# Patient Record
Sex: FEMALE | Race: BLACK OR AFRICAN AMERICAN | NOT HISPANIC OR LATINO | URBAN - METROPOLITAN AREA
[De-identification: names, ages, dates, MRNs, and addresses within clinical notes are randomized per-mention and may not be internally consistent; named-entity substitution may affect disease eponyms.]

---

## 2017-02-20 ENCOUNTER — EMERGENCY (EMERGENCY)
Facility: HOSPITAL | Age: 47
LOS: 1 days | Discharge: HOME | End: 2017-02-20
Attending: EMERGENCY MEDICINE | Admitting: EMERGENCY MEDICINE
Payer: COMMERCIAL

## 2017-02-20 VITALS
OXYGEN SATURATION: 100 % | RESPIRATION RATE: 16 BRPM | HEART RATE: 70 BPM | SYSTOLIC BLOOD PRESSURE: 142 MMHG | DIASTOLIC BLOOD PRESSURE: 75 MMHG | TEMPERATURE: 98 F

## 2017-02-20 DIAGNOSIS — Z90.49 ACQUIRED ABSENCE OF OTHER SPECIFIED PARTS OF DIGESTIVE TRACT: Chronic | ICD-10-CM

## 2017-02-20 DIAGNOSIS — Z98.84 BARIATRIC SURGERY STATUS: Chronic | ICD-10-CM

## 2017-02-20 PROCEDURE — 99284 EMERGENCY DEPT VISIT MOD MDM: CPT

## 2017-02-20 RX ORDER — SODIUM CHLORIDE 9 MG/ML
1000 INJECTION INTRAMUSCULAR; INTRAVENOUS; SUBCUTANEOUS ONCE
Qty: 0 | Refills: 0 | Status: COMPLETED | OUTPATIENT
Start: 2017-02-20 | End: 2017-02-20

## 2017-02-20 RX ORDER — METOCLOPRAMIDE HCL 10 MG
10 TABLET ORAL ONCE
Qty: 0 | Refills: 0 | Status: COMPLETED | OUTPATIENT
Start: 2017-02-20 | End: 2017-02-20

## 2017-02-20 RX ORDER — KETOROLAC TROMETHAMINE 30 MG/ML
30 SYRINGE (ML) INJECTION ONCE
Qty: 0 | Refills: 0 | Status: DISCONTINUED | OUTPATIENT
Start: 2017-02-20 | End: 2017-02-20

## 2017-02-20 RX ADMIN — SODIUM CHLORIDE 1000 MILLILITER(S): 9 INJECTION INTRAMUSCULAR; INTRAVENOUS; SUBCUTANEOUS at 23:22

## 2017-02-20 RX ADMIN — Medication 30 MILLIGRAM(S): at 23:21

## 2017-02-20 RX ADMIN — Medication 10 MILLIGRAM(S): at 23:22

## 2017-02-20 NOTE — ED PROVIDER NOTE - CARE PLAN
Principal Discharge DX:	Migraine Principal Discharge DX:	Migraine  Instructions for follow-up, activity and diet:	You were seen today for your migraine.  Rest in a dark quiet room and take ibuprofen as needed for continued pain.  Be sure to follow up with your primary care physician in 2-3 days for re-evaluation.  Call the medicine clinic if you need a new primary care physician at [phone].  RETURN TO THE EMERGENCY DEPARTMENT IMMEDIATELY IF YOU DEVELOP SEVERE PAIN THAT CANNOT BE CONTROLLED BY MEDICATION, FEVER, NECK STIFFNESS, OR FOR ANY OTHER CONCERN. Principal Discharge DX:	Migraine  Instructions for follow-up, activity and diet:	You were seen today for your migraine.  Rest in a dark quiet room and take ibuprofen as needed for continued pain.  Be sure to follow up with your primary care physician in 2-3 days for re-evaluation.  Call the medicine clinic if you need a new primary care physician at [phone].  RETURN TO THE EMERGENCY DEPARTMENT IMMEDIATELY IF YOU DEVELOP SEVERE PAIN THAT CANNOT BE CONTROLLED BY MEDICATION, FEVER, NECK STIFFNESS, OR FOR ANY OTHER CONCERN.

## 2017-02-20 NOTE — ED PROVIDER NOTE - NS ED MD SCRIBE ATTENDING SCRIBE SECTIONS
PAST MEDICAL/SURGICAL/SOCIAL HISTORY/HIV/VITAL SIGNS( Pullset)/HISTORY OF PRESENT ILLNESS/REVIEW OF SYSTEMS/PHYSICAL EXAM/DISPOSITION

## 2017-02-20 NOTE — ED PROVIDER NOTE - PLAN OF CARE
You were seen today for your migraine.  Rest in a dark quiet room and take ibuprofen as needed for continued pain.  Be sure to follow up with your primary care physician in 2-3 days for re-evaluation.  Call the medicine clinic if you need a new primary care physician at [phone].  RETURN TO THE EMERGENCY DEPARTMENT IMMEDIATELY IF YOU DEVELOP SEVERE PAIN THAT CANNOT BE CONTROLLED BY MEDICATION, FEVER, NECK STIFFNESS, OR FOR ANY OTHER CONCERN.

## 2017-02-20 NOTE — ED ADULT NURSE NOTE - OBJECTIVE STATEMENT
pt states she developed headache this morning with nausea about 12 n. c.o. fever of 101, blurry vision and "alittle" photosensitivity. c.o. generalized body aches. denies vomiting and diarrhea. pt c.o. pain in back of head 8/10 going into neck. sl placed labs sent. no neuceal rigidity noted. fluids infusing, medicated as ordered.

## 2017-02-20 NOTE — ED ADULT TRIAGE NOTE - CHIEF COMPLAINT QUOTE
c.o headache and blurry vision starting yesterday with sharp pains to generalized body. also c.o tinging to fingers. no weakness noted. pmh migraines anxiety anemia

## 2017-02-20 NOTE — ED PROVIDER NOTE - MEDICAL DECISION MAKING DETAILS
46y F with history of migraines with headache, blurred vision, subjective fever, and muscle aches. Suspect viral syndrome exacerbating known history of migraines. Will give IV fluid, Toradol, Reglan, and reassess. Pt requests med clinic f/u at [facility] as she is not happy with current provider, will give phone number. 46y F with history of migraines with headache, blurred vision, subjective fever, and muscle aches. Suspect viral syndrome exacerbating migraine ha. Will give IV fluid, Toradol, Reglan, and reassess. Pt requests med clinic f/u at [facility] as she is not happy with current provider, will give phone number.

## 2017-02-20 NOTE — ED PROVIDER NOTE - OBJECTIVE STATEMENT
46y F with PMHx of migraine presents to the ED c/o headache, muscle aches, and blurred vision x 2 days. Per pt she has been taking her home migraine medications without relief. Has had subjective fever at home. Denies recent travel, sick contacts, nausea, vomiting, diarrhea, dysuria, vaginal bleeding, vaginal discharge, or any other complaints.

## 2017-02-20 NOTE — ED PROVIDER NOTE - PROGRESS NOTE DETAILS
MD CHO:  Patient seen and reassessed.  Patient symptomatically improved.  Patient able to ambulate w/o difficulty, is tolerating PO intake.  Patient verbalized understanding of hospital course and outpatient plans, has decision making capacity.  Will follow-up with primary care physician in the next 2 days; patient will call for an appointment. Will return to the ED if there is any worsening of symptoms.

## 2017-02-20 NOTE — ED PROVIDER NOTE - DETAILS:
The scribe's documentation has been prepared under my direction and personally reviewed by me in its entirety. I confirm that the note above accurately reflects all work, treatment, procedures, and medical decision making performed by me (Dr. Adams).

## 2017-11-15 ENCOUNTER — APPOINTMENT (OUTPATIENT)
Dept: ENDOCRINOLOGY | Facility: CLINIC | Age: 47
End: 2017-11-15
Payer: COMMERCIAL

## 2017-11-15 VITALS
OXYGEN SATURATION: 97 % | BODY MASS INDEX: 39.78 KG/M2 | WEIGHT: 233 LBS | DIASTOLIC BLOOD PRESSURE: 76 MMHG | SYSTOLIC BLOOD PRESSURE: 122 MMHG | HEART RATE: 71 BPM | HEIGHT: 64 IN

## 2017-11-15 DIAGNOSIS — E04.1 NONTOXIC SINGLE THYROID NODULE: ICD-10-CM

## 2017-11-15 DIAGNOSIS — E55.9 VITAMIN D DEFICIENCY, UNSPECIFIED: ICD-10-CM

## 2017-11-15 DIAGNOSIS — R73.09 OTHER ABNORMAL GLUCOSE: ICD-10-CM

## 2017-11-15 DIAGNOSIS — R11.0 NAUSEA: ICD-10-CM

## 2017-11-15 DIAGNOSIS — R73.03 PREDIABETES.: ICD-10-CM

## 2017-11-15 PROCEDURE — 99244 OFF/OP CNSLTJ NEW/EST MOD 40: CPT

## 2017-11-15 RX ORDER — MECLIZINE HYDROCHLORIDE 25 MG/1
25 TABLET ORAL
Refills: 0 | Status: ACTIVE | COMMUNITY
Start: 2017-11-15

## 2017-11-15 RX ORDER — RANITIDINE HYDROCHLORIDE 300 MG/1
300 TABLET, FILM COATED ORAL
Refills: 0 | Status: ACTIVE | COMMUNITY
Start: 2017-11-15

## 2017-11-15 RX ORDER — METHOCARBAMOL 750 MG/1
750 TABLET, FILM COATED ORAL
Refills: 0 | Status: ACTIVE | COMMUNITY
Start: 2017-11-15

## 2017-11-15 RX ORDER — CHOLECALCIFEROL (VITAMIN D3) 1250 MCG
1.25 MG CAPSULE ORAL
Qty: 12 | Refills: 0 | Status: ACTIVE | COMMUNITY
Start: 2017-11-15

## 2017-11-17 LAB
25(OH)D3 SERPL-MCNC: 26.2 NG/ML
ALBUMIN SERPL ELPH-MCNC: 4 G/DL
ALP BLD-CCNC: 82 U/L
ALT SERPL-CCNC: 7 U/L
ANION GAP SERPL CALC-SCNC: 13 MMOL/L
AST SERPL-CCNC: 11 U/L
BILIRUB SERPL-MCNC: 0.2 MG/DL
BUN SERPL-MCNC: 12 MG/DL
CALCIUM SERPL-MCNC: 9.1 MG/DL
CHLORIDE SERPL-SCNC: 102 MMOL/L
CHOLEST SERPL-MCNC: 137 MG/DL
CHOLEST/HDLC SERPL: 2.7 RATIO
CO2 SERPL-SCNC: 24 MMOL/L
CREAT SERPL-MCNC: 0.86 MG/DL
GLUCOSE SERPL-MCNC: 77 MG/DL
HBA1C MFR BLD HPLC: 5.9 %
HDLC SERPL-MCNC: 50 MG/DL
IRON SERPL-MCNC: 68 UG/DL
LDLC SERPL CALC-MCNC: 75 MG/DL
POTASSIUM SERPL-SCNC: 4.5 MMOL/L
PROT SERPL-MCNC: 7.6 G/DL
SODIUM SERPL-SCNC: 139 MMOL/L
TRIGL SERPL-MCNC: 58 MG/DL
TSH SERPL-ACNC: 3.28 UIU/ML

## 2017-11-20 ENCOUNTER — CLINICAL ADVICE (OUTPATIENT)
Age: 47
End: 2017-11-20

## 2018-01-17 ENCOUNTER — EMERGENCY (EMERGENCY)
Facility: HOSPITAL | Age: 48
LOS: 1 days | Discharge: HOME | End: 2018-01-17
Attending: EMERGENCY MEDICINE | Admitting: EMERGENCY MEDICINE
Payer: COMMERCIAL

## 2018-01-17 VITALS
SYSTOLIC BLOOD PRESSURE: 131 MMHG | RESPIRATION RATE: 16 BRPM | DIASTOLIC BLOOD PRESSURE: 81 MMHG | HEART RATE: 69 BPM | OXYGEN SATURATION: 98 % | TEMPERATURE: 98 F

## 2018-01-17 DIAGNOSIS — Z98.84 BARIATRIC SURGERY STATUS: Chronic | ICD-10-CM

## 2018-01-17 DIAGNOSIS — Z90.49 ACQUIRED ABSENCE OF OTHER SPECIFIED PARTS OF DIGESTIVE TRACT: Chronic | ICD-10-CM

## 2018-01-17 PROCEDURE — 99284 EMERGENCY DEPT VISIT MOD MDM: CPT

## 2018-01-17 PROCEDURE — 71046 X-RAY EXAM CHEST 2 VIEWS: CPT | Mod: 26

## 2018-01-17 RX ORDER — ACETAMINOPHEN 500 MG
650 TABLET ORAL ONCE
Qty: 0 | Refills: 0 | Status: COMPLETED | OUTPATIENT
Start: 2018-01-17 | End: 2018-01-17

## 2018-01-17 RX ORDER — KETOROLAC TROMETHAMINE 30 MG/ML
15 SYRINGE (ML) INJECTION ONCE
Qty: 0 | Refills: 0 | Status: DISCONTINUED | OUTPATIENT
Start: 2018-01-17 | End: 2018-01-17

## 2018-01-17 RX ORDER — METOCLOPRAMIDE HCL 10 MG
10 TABLET ORAL ONCE
Qty: 0 | Refills: 0 | Status: COMPLETED | OUTPATIENT
Start: 2018-01-17 | End: 2018-01-17

## 2018-01-17 RX ADMIN — Medication 10 MILLIGRAM(S): at 12:59

## 2018-01-17 RX ADMIN — Medication 650 MILLIGRAM(S): at 12:59

## 2018-01-17 NOTE — ED PROVIDER NOTE - OBJECTIVE STATEMENT
46y/o F with PMHx of migraines (on Fioricet to no relief) presents to the ED c/o HA x2-3d, subjective fever beginning last night, cough 3-4w. Denies vomiting, rash, recent travel, abx use, any other complaints. Allergic to Morphine.

## 2018-01-17 NOTE — ED ADULT NURSE REASSESSMENT NOTE - NS ED NURSE REASSESS COMMENT FT1
Pt feels better.  Pt treated and released by MD Rosenberg with instruction, pt verbalized understanding.   IV removed.  Left stable.

## 2018-01-17 NOTE — ED ADULT TRIAGE NOTE - CHIEF COMPLAINT QUOTE
Pt c/o 10 days of headache, pain to left arm, and had swelling to left side of face that has resolved. Pt reports being seen at another hospital for same symptoms and "got a shot in my arm". Pt denies chest pain, denies N/V, denies dizziness. Pt states she's unable to take Motrin due to surgery she had in the past (not an allergy)

## 2018-02-23 ENCOUNTER — EMERGENCY (EMERGENCY)
Facility: HOSPITAL | Age: 48
LOS: 1 days | Discharge: HOME | End: 2018-02-23
Attending: EMERGENCY MEDICINE | Admitting: EMERGENCY MEDICINE
Payer: COMMERCIAL

## 2018-02-23 VITALS
RESPIRATION RATE: 14 BRPM | OXYGEN SATURATION: 100 % | HEART RATE: 66 BPM | TEMPERATURE: 99 F | DIASTOLIC BLOOD PRESSURE: 76 MMHG | SYSTOLIC BLOOD PRESSURE: 134 MMHG

## 2018-02-23 VITALS
OXYGEN SATURATION: 100 % | TEMPERATURE: 98 F | RESPIRATION RATE: 18 BRPM | DIASTOLIC BLOOD PRESSURE: 86 MMHG | HEART RATE: 65 BPM | SYSTOLIC BLOOD PRESSURE: 129 MMHG

## 2018-02-23 DIAGNOSIS — Z98.84 BARIATRIC SURGERY STATUS: Chronic | ICD-10-CM

## 2018-02-23 DIAGNOSIS — Z90.49 ACQUIRED ABSENCE OF OTHER SPECIFIED PARTS OF DIGESTIVE TRACT: Chronic | ICD-10-CM

## 2018-02-23 LAB
ALBUMIN SERPL ELPH-MCNC: 3.9 G/DL — SIGNIFICANT CHANGE UP (ref 3.3–5)
ALP SERPL-CCNC: 98 U/L — SIGNIFICANT CHANGE UP (ref 40–120)
ALT FLD-CCNC: 10 U/L — SIGNIFICANT CHANGE UP (ref 4–33)
APPEARANCE UR: CLEAR — SIGNIFICANT CHANGE UP
AST SERPL-CCNC: 15 U/L — SIGNIFICANT CHANGE UP (ref 4–32)
BACTERIA # UR AUTO: SIGNIFICANT CHANGE UP
BASOPHILS # BLD AUTO: 0.06 K/UL — SIGNIFICANT CHANGE UP (ref 0–0.2)
BASOPHILS NFR BLD AUTO: 0.6 % — SIGNIFICANT CHANGE UP (ref 0–2)
BILIRUB SERPL-MCNC: < 0.2 MG/DL — LOW (ref 0.2–1.2)
BILIRUB UR-MCNC: NEGATIVE — SIGNIFICANT CHANGE UP
BLOOD UR QL VISUAL: NEGATIVE — SIGNIFICANT CHANGE UP
BUN SERPL-MCNC: 8 MG/DL — SIGNIFICANT CHANGE UP (ref 7–23)
CALCIUM SERPL-MCNC: 8.7 MG/DL — SIGNIFICANT CHANGE UP (ref 8.4–10.5)
CHLORIDE SERPL-SCNC: 102 MMOL/L — SIGNIFICANT CHANGE UP (ref 98–107)
CK MB BLD-MCNC: 0.7 — SIGNIFICANT CHANGE UP (ref 0–2.5)
CK MB BLD-MCNC: 1.18 NG/ML — SIGNIFICANT CHANGE UP (ref 1–4.7)
CK SERPL-CCNC: 167 U/L — SIGNIFICANT CHANGE UP (ref 25–170)
CO2 SERPL-SCNC: 28 MMOL/L — SIGNIFICANT CHANGE UP (ref 22–31)
COLOR SPEC: SIGNIFICANT CHANGE UP
CREAT SERPL-MCNC: 0.8 MG/DL — SIGNIFICANT CHANGE UP (ref 0.5–1.3)
EOSINOPHIL # BLD AUTO: 0.32 K/UL — SIGNIFICANT CHANGE UP (ref 0–0.5)
EOSINOPHIL NFR BLD AUTO: 3.3 % — SIGNIFICANT CHANGE UP (ref 0–6)
GLUCOSE SERPL-MCNC: 87 MG/DL — SIGNIFICANT CHANGE UP (ref 70–99)
GLUCOSE UR-MCNC: NEGATIVE — SIGNIFICANT CHANGE UP
HCT VFR BLD CALC: 32.7 % — LOW (ref 34.5–45)
HGB BLD-MCNC: 10 G/DL — LOW (ref 11.5–15.5)
IMM GRANULOCYTES # BLD AUTO: 0.03 # — SIGNIFICANT CHANGE UP
IMM GRANULOCYTES NFR BLD AUTO: 0.3 % — SIGNIFICANT CHANGE UP (ref 0–1.5)
KETONES UR-MCNC: NEGATIVE — SIGNIFICANT CHANGE UP
LEUKOCYTE ESTERASE UR-ACNC: NEGATIVE — SIGNIFICANT CHANGE UP
LYMPHOCYTES # BLD AUTO: 4.28 K/UL — HIGH (ref 1–3.3)
LYMPHOCYTES # BLD AUTO: 44.1 % — HIGH (ref 13–44)
MCHC RBC-ENTMCNC: 23.5 PG — LOW (ref 27–34)
MCHC RBC-ENTMCNC: 30.6 % — LOW (ref 32–36)
MCV RBC AUTO: 76.8 FL — LOW (ref 80–100)
MONOCYTES # BLD AUTO: 0.62 K/UL — SIGNIFICANT CHANGE UP (ref 0–0.9)
MONOCYTES NFR BLD AUTO: 6.4 % — SIGNIFICANT CHANGE UP (ref 2–14)
MUCOUS THREADS # UR AUTO: SIGNIFICANT CHANGE UP
NEUTROPHILS # BLD AUTO: 4.39 K/UL — SIGNIFICANT CHANGE UP (ref 1.8–7.4)
NEUTROPHILS NFR BLD AUTO: 45.3 % — SIGNIFICANT CHANGE UP (ref 43–77)
NITRITE UR-MCNC: NEGATIVE — SIGNIFICANT CHANGE UP
NRBC # FLD: 0 — SIGNIFICANT CHANGE UP
PH UR: 7 — SIGNIFICANT CHANGE UP (ref 4.6–8)
PLATELET # BLD AUTO: 463 K/UL — HIGH (ref 150–400)
PMV BLD: 9.8 FL — SIGNIFICANT CHANGE UP (ref 7–13)
POTASSIUM SERPL-MCNC: 3.9 MMOL/L — SIGNIFICANT CHANGE UP (ref 3.5–5.3)
POTASSIUM SERPL-SCNC: 3.9 MMOL/L — SIGNIFICANT CHANGE UP (ref 3.5–5.3)
PROT SERPL-MCNC: 7.7 G/DL — SIGNIFICANT CHANGE UP (ref 6–8.3)
PROT UR-MCNC: NEGATIVE MG/DL — SIGNIFICANT CHANGE UP
RBC # BLD: 4.26 M/UL — SIGNIFICANT CHANGE UP (ref 3.8–5.2)
RBC # FLD: 16.9 % — HIGH (ref 10.3–14.5)
RBC CASTS # UR COMP ASSIST: SIGNIFICANT CHANGE UP (ref 0–?)
SODIUM SERPL-SCNC: 139 MMOL/L — SIGNIFICANT CHANGE UP (ref 135–145)
SP GR SPEC: 1.02 — SIGNIFICANT CHANGE UP (ref 1–1.04)
SQUAMOUS # UR AUTO: SIGNIFICANT CHANGE UP
TROPONIN T SERPL-MCNC: < 0.06 NG/ML — SIGNIFICANT CHANGE UP (ref 0–0.06)
UROBILINOGEN FLD QL: NORMAL MG/DL — SIGNIFICANT CHANGE UP
WBC # BLD: 9.7 K/UL — SIGNIFICANT CHANGE UP (ref 3.8–10.5)
WBC # FLD AUTO: 9.7 K/UL — SIGNIFICANT CHANGE UP (ref 3.8–10.5)
WBC UR QL: SIGNIFICANT CHANGE UP (ref 0–?)

## 2018-02-23 PROCEDURE — 99285 EMERGENCY DEPT VISIT HI MDM: CPT | Mod: 25

## 2018-02-23 PROCEDURE — 71046 X-RAY EXAM CHEST 2 VIEWS: CPT | Mod: 26

## 2018-02-23 PROCEDURE — 93010 ELECTROCARDIOGRAM REPORT: CPT

## 2018-02-23 RX ORDER — DIAZEPAM 5 MG
1 TABLET ORAL
Qty: 3 | Refills: 0
Start: 2018-02-23 | End: 2018-02-23

## 2018-02-23 RX ORDER — METOCLOPRAMIDE HCL 10 MG
10 TABLET ORAL ONCE
Qty: 0 | Refills: 0 | Status: COMPLETED | OUTPATIENT
Start: 2018-02-23 | End: 2018-02-23

## 2018-02-23 RX ADMIN — Medication 10 MILLIGRAM(S): at 20:46

## 2018-02-23 NOTE — ED PROVIDER NOTE - PLAN OF CARE
Follow up with your primary care provider within 48 hours  Follow up with your cardiologist for an outpatient stress test  Rest and drink fluids  Take Tylenol 650mg every 6 hours as needed for headache  Return to the emergency department with any worsening or concerning symptoms, increased headache, chest pain, shortness of breath, weakness or any other concerns. Follow up with your primary care provider within 48 hours  Follow up with your cardiologist for an outpatient stress test  Follow up with a neurologist within 1 week, referral list provided  Take Valium 5mg, 1 tablet, every 8 hours as needed for muscle spasm, caution this medication causes drowsiness do not drive or drink alcohol while taking  Rest and drink fluids  Take Tylenol 650mg every 6 hours as needed for headache  Return to the emergency department with any worsening or concerning symptoms, increased headache, chest pain, shortness of breath, weakness or any other concerns.

## 2018-02-23 NOTE — ED PROVIDER NOTE - OBJECTIVE STATEMENT
47yF w/pmhx migraines and anxiety presenting with left sided chest pain and headache x 3 days. 47yF w/pmhx migraines and anxiety presenting with left sided chest pain and headache x 3 days. Pt states she has been experiencing intermittent headaches for the past few months, hx of migraines, describes headache as sharp pains in the posterior of her head, no photophobia or phonophobia. Pt states the chest pain had been intermittent but this afternoon has become constant, left sided with radiation to left arm, described as a soreness, feels better "after massaging the area", feels worse with lying flat. In addition pt has right sided neck tightness and mid back pain. Pt states this afternoon she developed sharp pains to her right lower back. Denies dysuria, hematuria, shortness of breath 47yF w/pmhx migraines, anxiety and anemia presenting with left sided chest pain and headache x 3 days. Pt states she has been experiencing intermittent headaches for the past few months, hx of migraines, describes headache as sharp pains in the posterior of her head, no photophobia or phonophobia. Pt states the chest pain had been intermittent but this afternoon has become constant, left sided with radiation to left arm, described as a soreness, feels better "after massaging the area", feels worse with lying flat. In addition pt has right sided neck tightness and mid back pain. Pt states this afternoon she developed sharp pains to her right lower back. +leg cramping intermittently the past few weeks. Denies dysuria, hematuria, shortness of breath, fever/chills, 47yF w/pmhx migraines, anxiety and anemia presenting with left sided chest pain and headache x 3 days. Pt states she has been experiencing intermittent headaches for the past few months, hx of migraines, describes headache as sharp pains in the posterior of her head, no photophobia or phonophobia. Pt states the chest pain had been intermittent but this afternoon has become constant, left sided with radiation to left arm, described as a soreness, feels better "after massaging the area", feels worse with lying flat. In addition pt has right sided neck tightness and mid back pain. Pt states this afternoon she developed sharp pains to her right lower back. +leg cramping intermittently the past few weeks. Denies dysuria, hematuria, shortness of breath, fever/chills, sick contacts, palpitations, leg swelling, OCP use, recent travel, recent immobilization or any other concerns.  Pt states her chest pain worsened while at work when she started to feel anxious

## 2018-02-23 NOTE — ED ADULT NURSE REASSESSMENT NOTE - NS ED NURSE REASSESS COMMENT FT1
Intake pt coming with sternal pain x few days rad. to left shoulder and axillary area denies dizziness/no SOB lab sent pt AOX 3 morbidly obese, lab sent, pending dispo.    Olivia Coats RN

## 2018-02-23 NOTE — ED PROVIDER NOTE - CARE PLAN
Assessment and plan of treatment:	Follow up with your primary care provider within 48 hours  Follow up with your cardiologist for an outpatient stress test  Rest and drink fluids  Take Tylenol 650mg every 6 hours as needed for headache  Return to the emergency department with any worsening or concerning symptoms, increased headache, chest pain, shortness of breath, weakness or any other concerns. Principal Discharge DX:	Chest pain  Assessment and plan of treatment:	Follow up with your primary care provider within 48 hours  Follow up with your cardiologist for an outpatient stress test  Follow up with a neurologist within 1 week, referral list provided  Take Valium 5mg, 1 tablet, every 8 hours as needed for muscle spasm, caution this medication causes drowsiness do not drive or drink alcohol while taking  Rest and drink fluids  Take Tylenol 650mg every 6 hours as needed for headache  Return to the emergency department with any worsening or concerning symptoms, increased headache, chest pain, shortness of breath, weakness or any other concerns.

## 2018-02-23 NOTE — ED PROVIDER NOTE - PROGRESS NOTE DETAILS
RUBEN Parsons: Discussed results with patient who is feeling well and agrees to PMD follow up and d/c home RUBEN Parsons: Discussed results with patient who is feeling well and agrees to PMD follow up and d/c home. Pt will f/u with her cardiologist for an outpatient stress test

## 2018-02-23 NOTE — ED PROVIDER NOTE - MEDICAL DECISION MAKING DETAILS
47yF w/ pmhx anemia, migraines, anxiety p/w chest pain x 3 days, mild headache. 47yF w/ pmhx anemia, migraines, anxiety p/w chest pain x 3 days, mild headache. Likely symptoms due to anxiety. Will check one set of cardiac enzymes, basic labs, ua and chest xray.

## 2018-02-23 NOTE — ED PROVIDER NOTE - ATTENDING CONTRIBUTION TO CARE
MD Bailey:  I performed a face to face bedside interview with patient regarding history of present illness, review of symptoms and past medical history. I completed an independent physical exam(documented below).  I have discussed patient's plan of care with ACP.   I agree with note as stated above, having amended the EMR as needed to reflect my findings. I have discussed the assessment and plan of care.  This includes during the time I functioned as the attending physician for this patient.  PE:  Gen: Alert, NAD  Head: NC, AT,  EOMI, normal lids/conjunctiva  ENT:  normal hearing, patent oropharynx without erythema/exudate, uvula midline  Neck: +supple, no tenderness/meningismus/JVD, +Trachea midline  Chest: b/l chest wall ttp, equal chest rise  Pulm: Bilateral BS, normal resp effort, no wheeze/stridor/retractions  CV: RRR, no M/R/G, +dist pulses  Abd: soft, NT/ND, +BS, no hepatosplenomegaly  Rectal: deferred  Mskel: no edema/erythema/cyanosis  Skin: no rash  Neuro: AAOx3, no sensory/motor deficits, CN 2-12 intact   MDM:  46yo F w/ pmh inclusive of migraines and anxiety, and sleeve gastrectomy, presenting for [facility]MC including headache, b/l neck pain X 3 months(for which she was evaluated here and at [facility], including negative CTA head/neck), back pain, intermittent whole body cramping, diffuse intermittent paresthesias, cp X 3days, constant since this morning, ?rads to left shoulder, not exertional, reproducible w/ certain movements and when pt presses on chest, non-smoker, denies etoh or drug use, some family hx of early heart dx, has been seen by family cardiologist recently, had normal 72hr holter monitoring, no stress test yet. H&P most consistent w/ msk pain, will r/o electrolyte imbalance, one set of Bharti, muscle relaxer, outpt cards f/u for stress test. Pt understands and agrees w/ plan.

## 2018-02-23 NOTE — ED ADULT TRIAGE NOTE - CHIEF COMPLAINT QUOTE
Pt c/o of chest pain states pain radiates to the left axillary and breast area and states she has right flank and lower back pain and states she has burning in her head.

## 2018-02-25 LAB — SPECIMEN SOURCE: SIGNIFICANT CHANGE UP

## 2018-02-26 LAB
-  AMPICILLIN: SIGNIFICANT CHANGE UP
-  CIPROFLOXACIN: SIGNIFICANT CHANGE UP
-  NITROFURANTOIN: SIGNIFICANT CHANGE UP
-  TETRACYCLINE: SIGNIFICANT CHANGE UP
-  VANCOMYCIN: SIGNIFICANT CHANGE UP
BACTERIA UR CULT: SIGNIFICANT CHANGE UP
METHOD TYPE: SIGNIFICANT CHANGE UP
ORGANISM # SPEC MICROSCOPIC CNT: SIGNIFICANT CHANGE UP

## 2018-02-27 RX ORDER — MOXIFLOXACIN HYDROCHLORIDE TABLETS, 400 MG 400 MG/1
1 TABLET, FILM COATED ORAL
Qty: 14 | Refills: 0
Start: 2018-02-27 | End: 2018-03-05

## 2018-02-27 NOTE — ED POST DISCHARGE NOTE - RESULT SUMMARY
UCX : Enterococcus faecalis 50,000-99,000CFU/ml and gram neg rods less than 10,000. Patient contact # [phone] patient denies fever, chills or dysuria but admits to some back pain that radiates to breast  bone.  Discussed with patient needs to follow up with MD and if can't get appt or has worsening symptoms or worrisome symptoms to return to ED. Discussed with patient can ERX Cipro 500mg BID X 7 days and then patient needs to follow up with MD for repeat UA/UCX. Patient denies any pregnancy presently. Discussed with patient need to return to ED if symptoms don't continue to improve or recur or develops any new or worsening symptoms that are of concern.

## 2018-05-11 ENCOUNTER — APPOINTMENT (OUTPATIENT)
Dept: ENDOCRINOLOGY | Facility: CLINIC | Age: 48
End: 2018-05-11

## 2019-02-12 ENCOUNTER — EMERGENCY (EMERGENCY)
Facility: HOSPITAL | Age: 49
LOS: 1 days | Discharge: HOME | End: 2019-02-12
Attending: EMERGENCY MEDICINE | Admitting: EMERGENCY MEDICINE
Payer: COMMERCIAL

## 2019-02-12 VITALS
DIASTOLIC BLOOD PRESSURE: 90 MMHG | OXYGEN SATURATION: 100 % | HEART RATE: 67 BPM | TEMPERATURE: 98 F | RESPIRATION RATE: 16 BRPM | SYSTOLIC BLOOD PRESSURE: 148 MMHG

## 2019-02-12 DIAGNOSIS — Z90.49 ACQUIRED ABSENCE OF OTHER SPECIFIED PARTS OF DIGESTIVE TRACT: Chronic | ICD-10-CM

## 2019-02-12 DIAGNOSIS — Z98.84 BARIATRIC SURGERY STATUS: Chronic | ICD-10-CM

## 2019-02-12 LAB
ANION GAP SERPL CALC-SCNC: 11 MMO/L — SIGNIFICANT CHANGE UP (ref 7–14)
BUN SERPL-MCNC: 11 MG/DL — SIGNIFICANT CHANGE UP (ref 7–23)
CALCIUM SERPL-MCNC: 8.8 MG/DL — SIGNIFICANT CHANGE UP (ref 8.4–10.5)
CHLORIDE SERPL-SCNC: 103 MMOL/L — SIGNIFICANT CHANGE UP (ref 98–107)
CO2 SERPL-SCNC: 26 MMOL/L — SIGNIFICANT CHANGE UP (ref 22–31)
CREAT SERPL-MCNC: 0.84 MG/DL — SIGNIFICANT CHANGE UP (ref 0.5–1.3)
GLUCOSE SERPL-MCNC: 90 MG/DL — SIGNIFICANT CHANGE UP (ref 70–99)
HCT VFR BLD CALC: 30.2 % — LOW (ref 34.5–45)
HGB BLD-MCNC: 9.2 G/DL — LOW (ref 11.5–15.5)
MCHC RBC-ENTMCNC: 23.4 PG — LOW (ref 27–34)
MCHC RBC-ENTMCNC: 30.5 % — LOW (ref 32–36)
MCV RBC AUTO: 76.6 FL — LOW (ref 80–100)
NRBC # FLD: 0 K/UL — LOW (ref 25–125)
PLATELET # BLD AUTO: 470 K/UL — HIGH (ref 150–400)
PMV BLD: 9.6 FL — SIGNIFICANT CHANGE UP (ref 7–13)
POTASSIUM SERPL-MCNC: 4.1 MMOL/L — SIGNIFICANT CHANGE UP (ref 3.5–5.3)
POTASSIUM SERPL-SCNC: 4.1 MMOL/L — SIGNIFICANT CHANGE UP (ref 3.5–5.3)
RBC # BLD: 3.94 M/UL — SIGNIFICANT CHANGE UP (ref 3.8–5.2)
RBC # FLD: 16.1 % — HIGH (ref 10.3–14.5)
SODIUM SERPL-SCNC: 140 MMOL/L — SIGNIFICANT CHANGE UP (ref 135–145)
WBC # BLD: 7.56 K/UL — SIGNIFICANT CHANGE UP (ref 3.8–10.5)
WBC # FLD AUTO: 7.56 K/UL — SIGNIFICANT CHANGE UP (ref 3.8–10.5)

## 2019-02-12 PROCEDURE — 71046 X-RAY EXAM CHEST 2 VIEWS: CPT | Mod: 26

## 2019-02-12 PROCEDURE — 70360 X-RAY EXAM OF NECK: CPT | Mod: 26

## 2019-02-12 PROCEDURE — 99284 EMERGENCY DEPT VISIT MOD MDM: CPT

## 2019-02-12 RX ORDER — METOCLOPRAMIDE HCL 10 MG
10 TABLET ORAL ONCE
Qty: 0 | Refills: 0 | Status: COMPLETED | OUTPATIENT
Start: 2019-02-12 | End: 2019-02-12

## 2019-02-12 RX ORDER — KETOROLAC TROMETHAMINE 30 MG/ML
30 SYRINGE (ML) INJECTION ONCE
Qty: 0 | Refills: 0 | Status: DISCONTINUED | OUTPATIENT
Start: 2019-02-12 | End: 2019-02-12

## 2019-02-12 RX ADMIN — Medication 30 MILLIGRAM(S): at 11:55

## 2019-02-12 RX ADMIN — Medication 10 MILLIGRAM(S): at 11:55

## 2019-02-12 NOTE — ED ADULT TRIAGE NOTE - CHIEF COMPLAINT QUOTE
Presents to the ER with c/o neck pain, b/l arm swelling and left leg swelling since yesterday also c/o tightness to chest since this morning and SOB   Denies fever pt speaking full sentences in triage no distress noted

## 2019-02-12 NOTE — ED PROVIDER NOTE - OBJECTIVE STATEMENT
49yo with anxiety and migraines p/w 2 days of neck swelling and pain. Pt reports having a cold last week with runny nose and nausea which resolved over time, then over last two days noticed neck tightness and swelling with "bumps" around her neck. Also complaining of b/l headache with blurry vision different from her normal migraines and not responding to fioricet. Pt also noticing RUE swelling and pain in her left upper and lower extremities in the joints. reports fever of 99 at home but no chills, n/v/d. reports whole family is sick with cold. no recent travel.

## 2019-02-12 NOTE — ED PROVIDER NOTE - CLINICAL SUMMARY MEDICAL DECISION MAKING FREE TEXT BOX
49yo woman presenting with neck pain, swelling, and pain with swallowing following URI symptoms last week. Exam significant for clear oropharynx, no tonsillar exudates, small lymph nodes b/l neck, and clear lungs. Pt also having arthralgias and migraine. She was given reglan and toradol with resolution of pain. CXR and soft tissue neck imaging show no urgent findings. 49yo woman presenting with neck pain, swelling, and pain with swallowing following URI symptoms last week. Exam significant for clear oropharynx, no tonsillar exudates, small lymph nodes b/l neck, and clear lungs. Palpable thyroid but NT and no weight loss, heat intolerance, proptosis, myxedema. Pt also having arthralgias and migraine. She was given reglan and toradol with resolution of pain. CXR and soft tissue neck imaging show no urgent findings.

## 2019-02-12 NOTE — ED PROVIDER NOTE - PHYSICAL EXAMINATION
GENERAL: No acute distress, well-developed  HEAD:  Atraumatic, Normocephalic  ENT: EOMI, conjunctiva and sclera clear, + shotty LAD anterior neck, pain to palpation, full ROM, no JVD, moist mucosa  CHEST/LUNG: Clear to auscultation bilaterally; No wheeze, equal breath sounds bilaterally   HEART: Regular rate and rhythm; No murmurs, rubs, or gallops  ABDOMEN: tender to palpation of suprapubic area, Soft Nondistended; Bowel sounds present  EXTREMITIES:  No clubbing, cyanosis, or edema  PSYCH: Nl behavior, nl affect  NEUROLOGY: AAOx3, non-focal, cranial nerves intact  SKIN: Normal color, No rashes or lesions GENERAL: No acute distress, well-developed  HEAD:  Atraumatic, Normocephalic  ENT: EOMI, conjunctiva and sclera clear, large tonsils without exudates, + shotty LAD anterior neck, pain to palpation, full ROM, no JVD, moist mucosa  CHEST/LUNG: Clear to auscultation bilaterally; No wheeze, equal breath sounds bilaterally   HEART: Regular rate and rhythm; No murmurs, rubs, or gallops  ABDOMEN: tender to palpation of suprapubic area, Soft Nondistended; Bowel sounds present  EXTREMITIES:  No clubbing, cyanosis, or edema  PSYCH: Nl behavior, nl affect  NEUROLOGY: AAOx3, non-focal, cranial nerves intact  SKIN: Normal color, No rashes or lesions

## 2019-02-12 NOTE — ED PROVIDER NOTE - NSFOLLOWUPINSTRUCTIONS_ED_ALL_ED_FT
Please return to the emergency room if symptoms worsen or fail to improve. Take ibuprofen as needed for pain control and drink lots of fluids to stay hydrated. Follow up with your primary care doctor.

## 2019-02-12 NOTE — ED ADULT NURSE NOTE - OBJECTIVE STATEMENT
Pt presents to intake room 1, A&Ox3, ambulatory at baseline without assistance, here for evaluation of headache, right elbow pain and swelling, and neck pain. Denies any chest pain, dizziness, nausea, vomiting, shortness of breath, palpitations, diarrhea, fever, constipation, or chills. IV established in left ac with a 20g, labs drawn and sent, call bell in reach, side rails up, bed in locked position, md evaluation in progress, will continue to monitor.

## 2019-02-12 NOTE — ED PROVIDER NOTE - NS ED ROS FT
REVIEW OF SYSTEMS:  CONSTITUTIONAL: No fevers or chills  EYES/ENT: +blurry vision;  No vertigo or throat pain   NECK: +pain to palpation, swelling, reports "bumps"   RESPIRATORY: +slight sob, No cough, wheezing  CARDIOVASCULAR: No chest pain or palpitations  GASTROINTESTINAL: No abdominal or epigastric pain. No nausea, vomiting, or hematemesis; No diarrhea or constipation.   GENITOURINARY: No dysuria, frequency or hematuria  NEUROLOGICAL: No numbness or weakness  SKIN: No itching, burning, rashes, or lesions

## 2019-07-07 ENCOUNTER — EMERGENCY (EMERGENCY)
Facility: HOSPITAL | Age: 49
LOS: 1 days | Discharge: HOME | End: 2019-07-07
Attending: EMERGENCY MEDICINE
Payer: COMMERCIAL

## 2019-07-07 VITALS
DIASTOLIC BLOOD PRESSURE: 101 MMHG | OXYGEN SATURATION: 98 % | SYSTOLIC BLOOD PRESSURE: 150 MMHG | HEART RATE: 63 BPM | RESPIRATION RATE: 20 BRPM | TEMPERATURE: 98 F

## 2019-07-07 DIAGNOSIS — Z98.84 BARIATRIC SURGERY STATUS: Chronic | ICD-10-CM

## 2019-07-07 DIAGNOSIS — Z90.49 ACQUIRED ABSENCE OF OTHER SPECIFIED PARTS OF DIGESTIVE TRACT: Chronic | ICD-10-CM

## 2019-07-07 LAB
ALBUMIN SERPL ELPH-MCNC: 3.8 G/DL — SIGNIFICANT CHANGE UP (ref 3.3–5)
ALP SERPL-CCNC: 95 U/L — SIGNIFICANT CHANGE UP (ref 40–120)
ALT FLD-CCNC: 7 U/L — LOW (ref 10–45)
ANION GAP SERPL CALC-SCNC: 12 MMOL/L — SIGNIFICANT CHANGE UP (ref 5–17)
AST SERPL-CCNC: 11 U/L — SIGNIFICANT CHANGE UP (ref 10–40)
BASOPHILS # BLD AUTO: 0.1 K/UL — SIGNIFICANT CHANGE UP (ref 0–0.2)
BASOPHILS NFR BLD AUTO: 0.7 % — SIGNIFICANT CHANGE UP (ref 0–2)
BILIRUB SERPL-MCNC: 0.1 MG/DL — LOW (ref 0.2–1.2)
BUN SERPL-MCNC: 10 MG/DL — SIGNIFICANT CHANGE UP (ref 7–23)
CALCIUM SERPL-MCNC: 8.6 MG/DL — SIGNIFICANT CHANGE UP (ref 8.4–10.5)
CHLORIDE SERPL-SCNC: 100 MMOL/L — SIGNIFICANT CHANGE UP (ref 96–108)
CO2 SERPL-SCNC: 25 MMOL/L — SIGNIFICANT CHANGE UP (ref 22–31)
CREAT SERPL-MCNC: 0.8 MG/DL — SIGNIFICANT CHANGE UP (ref 0.5–1.3)
EOSINOPHIL # BLD AUTO: 0.2 K/UL — SIGNIFICANT CHANGE UP (ref 0–0.5)
EOSINOPHIL NFR BLD AUTO: 2.5 % — SIGNIFICANT CHANGE UP (ref 0–6)
GLUCOSE SERPL-MCNC: 103 MG/DL — HIGH (ref 70–99)
HCT VFR BLD CALC: 31.7 % — LOW (ref 34.5–45)
HGB BLD-MCNC: 10.8 G/DL — LOW (ref 11.5–15.5)
LIDOCAIN IGE QN: 20 U/L — SIGNIFICANT CHANGE UP (ref 7–60)
LYMPHOCYTES # BLD AUTO: 3.7 K/UL — HIGH (ref 1–3.3)
LYMPHOCYTES # BLD AUTO: 42.6 % — SIGNIFICANT CHANGE UP (ref 13–44)
MCHC RBC-ENTMCNC: 26.1 PG — LOW (ref 27–34)
MCHC RBC-ENTMCNC: 34.1 GM/DL — SIGNIFICANT CHANGE UP (ref 32–36)
MCV RBC AUTO: 76.6 FL — LOW (ref 80–100)
MONOCYTES # BLD AUTO: 0.5 K/UL — SIGNIFICANT CHANGE UP (ref 0–0.9)
MONOCYTES NFR BLD AUTO: 6.2 % — SIGNIFICANT CHANGE UP (ref 2–14)
NEUTROPHILS # BLD AUTO: 4.2 K/UL — SIGNIFICANT CHANGE UP (ref 1.8–7.4)
NEUTROPHILS NFR BLD AUTO: 47.9 % — SIGNIFICANT CHANGE UP (ref 43–77)
PLATELET # BLD AUTO: 548 K/UL — HIGH (ref 150–400)
POTASSIUM SERPL-MCNC: 3.6 MMOL/L — SIGNIFICANT CHANGE UP (ref 3.5–5.3)
POTASSIUM SERPL-SCNC: 3.6 MMOL/L — SIGNIFICANT CHANGE UP (ref 3.5–5.3)
PROT SERPL-MCNC: 7.5 G/DL — SIGNIFICANT CHANGE UP (ref 6–8.3)
RBC # BLD: 4.13 M/UL — SIGNIFICANT CHANGE UP (ref 3.8–5.2)
RBC # FLD: 13.5 % — SIGNIFICANT CHANGE UP (ref 10.3–14.5)
SODIUM SERPL-SCNC: 137 MMOL/L — SIGNIFICANT CHANGE UP (ref 135–145)
WBC # BLD: 8.8 K/UL — SIGNIFICANT CHANGE UP (ref 3.8–10.5)
WBC # FLD AUTO: 8.8 K/UL — SIGNIFICANT CHANGE UP (ref 3.8–10.5)

## 2019-07-07 PROCEDURE — 93010 ELECTROCARDIOGRAM REPORT: CPT

## 2019-07-07 PROCEDURE — 99284 EMERGENCY DEPT VISIT MOD MDM: CPT

## 2019-07-07 RX ORDER — ACETAMINOPHEN 500 MG
1000 TABLET ORAL ONCE
Refills: 0 | Status: COMPLETED | OUTPATIENT
Start: 2019-07-07 | End: 2019-07-07

## 2019-07-07 RX ORDER — ONDANSETRON 8 MG/1
4 TABLET, FILM COATED ORAL ONCE
Refills: 0 | Status: COMPLETED | OUTPATIENT
Start: 2019-07-07 | End: 2019-07-07

## 2019-07-07 RX ORDER — ACETAMINOPHEN 500 MG
1000 TABLET ORAL ONCE
Refills: 0 | Status: DISCONTINUED | OUTPATIENT
Start: 2019-07-07 | End: 2019-07-07

## 2019-07-07 RX ORDER — FAMOTIDINE 10 MG/ML
20 INJECTION INTRAVENOUS ONCE
Refills: 0 | Status: COMPLETED | OUTPATIENT
Start: 2019-07-07 | End: 2019-07-07

## 2019-07-07 RX ORDER — ACETAMINOPHEN 500 MG
650 TABLET ORAL ONCE
Refills: 0 | Status: DISCONTINUED | OUTPATIENT
Start: 2019-07-07 | End: 2019-07-07

## 2019-07-07 RX ADMIN — ONDANSETRON 4 MILLIGRAM(S): 8 TABLET, FILM COATED ORAL at 23:05

## 2019-07-07 RX ADMIN — Medication 400 MILLIGRAM(S): at 23:00

## 2019-07-07 RX ADMIN — FAMOTIDINE 20 MILLIGRAM(S): 10 INJECTION INTRAVENOUS at 23:05

## 2019-07-07 NOTE — ED PROVIDER NOTE - PROGRESS NOTE DETAILS
Daniel PGY4: CT a/p without acute pathology; pt found to have cervix abnormality which was explained to pt. Pt to follow up with gyn. abd/back pain improved. Simon SPANGLER: Results discussed with patient, declining an enema here - prefers to go home. Return instructions discussed in detail.

## 2019-07-07 NOTE — ED ADULT NURSE NOTE - OBJECTIVE STATEMENT
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 pt is alert and oriented x 4 and speaking coherently. pt states she had abdominal pain 2 weeks ago, was seen at [place] and was given a fleet enema for constipation. pt states she then began to have right arm and leg swelling(arms and legs do not appear obviously swollen). Pt states he abdominal pain never went away and her abdomen is now swollen, and that she has a lump in her right flank. 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 pt is alert and oriented x 4 and speaking coherently. pt states she had abdominal pain 2 weeks ago, was seen at [place] and was given a fleet enema for constipation. pt states she then began to have right arm and leg swelling(arms and legs do not appear obviously swollen). Pt states he abdominal pain never went away and her abdomen is now swollen, and that she has a lump in her right flank. Pt also c/o a migraine that started right before coming, pt moving all extremities. Pt denies cp, sob, vomiting, diarrhea, fevers, chills. pt states her abdomen is swollen larger than usual. Pending md sargent, will reassess.

## 2019-07-07 NOTE — ED PROVIDER NOTE - CLINICAL SUMMARY MEDICAL DECISION MAKING FREE TEXT BOX
48F PMH gastric sleeve, cholecystectomy p/w one week of back and RUQ pain, no CVA tenderness, RUQ tender and ?mass palpated. Will get CT abd/pelvis, CBC CMP lipase, symptom control and reassess

## 2019-07-07 NOTE — ED PROVIDER NOTE - ATTENDING CONTRIBUTION TO CARE
Patient is a 49 yo F history of gastric sleeve, hx of cholecystectomy here for diffuse abdominal pain, R > L, feels sharp. + nausea, no vomiting. No urinary symptoms. Patient is a 49 yo F history of gastric sleeve, hx of cholecystectomy here for diffuse abdominal pain, R > L, feels sharp. + nausea, no vomiting. No urinary symptoms. Pain has been for one week. She went to [facility], had a fleet enema and had some improved in symptoms. She reports feeling constipated now. No chest pain, shortness of breath, fever, diarrhea. Denies urinary symptoms.     VS noted  Gen. no acute distress, Non toxic   HEENT: EOMI, mmm  Lungs: CTAB/L no C/ W /R   CVS: RRR   Abd; Soft, non-tender on my exam, no rebound or guarding  Ext: no edema  Skin: no rash  Neuro AAOx3 non focal clear speech  a/p: abdominal pain, nausea, hx of multiple surgeries. Differential includes constipation, SBO. Will check labs, CT A/P.   - Simon SPANGLER

## 2019-07-07 NOTE — ED PROVIDER NOTE - NSFOLLOWUPINSTRUCTIONS_ED_ALL_ED_FT
You were seen in the ED for abdominal pain. You had a CT scan which did not show any reason for your pain.  You have an abnormality in your cervix which needs to be evaluated by a gyn with an ultrasound.  Return to the ER if you develop any fevers, chills, vomiting, bloody/black stools or if any other concerning symptoms arise.  Continue to take all medications as previously prescribed. You were seen in the ED for abdominal pain. You had a CT scan which did not show any reason for your pain.  You have an abnormality in your cervix which needs to be evaluated by a gyn with an ultrasound.  You can use lidocaine patches as needed for back pain.  You can use glycerin suppositories as needed for constipation.  Return to the ER if you develop any fevers, chills, vomiting, bloody/black stools or if any other concerning symptoms arise.  Continue to take all medications as previously prescribed.  Follow up with your PMD within 48 hours.  Follow up with GI at the next available appointment.

## 2019-07-07 NOTE — ED PROVIDER NOTE - NSFOLLOWUPCLINICS_GEN_ALL_ED_FT
A Family Medicine Doctor  Family Medicine  .  [place]   Phone:   Fax:   Follow Up Time:     [facility]  Gastroenterology  [address], [address]  [place] [address]  Phone: [phone]  Fax:   Follow Up Time: 7-10 Days

## 2019-07-07 NOTE — ED PROVIDER NOTE - OBJECTIVE STATEMENT
48F PMH migraines, gastric sleeve, cholecystectomy, p/w one week of abdominal pain, back pain and nausea. Pt reports she was seen at [facility] one week ago for symptoms, had a fleet enema with some improvement in symptoms, however symptoms recurred. Pt also reports nausea. Pt also with right sided back pain radiating to the abdomen. Denies chest pain, SOB, fevers, diarrhea, hematochezia, melena.

## 2019-07-08 VITALS
SYSTOLIC BLOOD PRESSURE: 116 MMHG | HEART RATE: 60 BPM | TEMPERATURE: 98 F | DIASTOLIC BLOOD PRESSURE: 77 MMHG | RESPIRATION RATE: 16 BRPM | OXYGEN SATURATION: 100 %

## 2019-07-08 PROCEDURE — 84702 CHORIONIC GONADOTROPIN TEST: CPT

## 2019-07-08 PROCEDURE — 96366 THER/PROPH/DIAG IV INF ADDON: CPT

## 2019-07-08 PROCEDURE — 85027 COMPLETE CBC AUTOMATED: CPT

## 2019-07-08 PROCEDURE — 83690 ASSAY OF LIPASE: CPT

## 2019-07-08 PROCEDURE — 74177 CT ABD & PELVIS W/CONTRAST: CPT | Mod: 26

## 2019-07-08 PROCEDURE — 80053 COMPREHEN METABOLIC PANEL: CPT

## 2019-07-08 PROCEDURE — 96365 THER/PROPH/DIAG IV INF INIT: CPT | Mod: XU

## 2019-07-08 PROCEDURE — 93005 ELECTROCARDIOGRAM TRACING: CPT

## 2019-07-08 PROCEDURE — 99284 EMERGENCY DEPT VISIT MOD MDM: CPT | Mod: 25

## 2019-07-08 PROCEDURE — 96375 TX/PRO/DX INJ NEW DRUG ADDON: CPT

## 2019-07-08 PROCEDURE — 74177 CT ABD & PELVIS W/CONTRAST: CPT

## 2019-07-08 RX ORDER — LIDOCAINE 4 G/100G
1 CREAM TOPICAL ONCE
Refills: 0 | Status: COMPLETED | OUTPATIENT
Start: 2019-07-08 | End: 2019-07-08

## 2019-07-08 RX ADMIN — LIDOCAINE 1 PATCH: 4 CREAM TOPICAL at 03:55

## 2019-07-08 RX ADMIN — Medication 1000 MILLIGRAM(S): at 03:53

## 2020-01-13 NOTE — ED ADULT TRIAGE NOTE - NS ED TRIAGE AVPU SCALE
Alert-The patient is alert, awake and responds to voice. The patient is oriented to time, place, and person. The triage nurse is able to obtain subjective information.
155

## 2020-05-27 PROBLEM — G43.909 MIGRAINE, UNSPECIFIED, NOT INTRACTABLE, WITHOUT STATUS MIGRAINOSUS: Chronic | Status: ACTIVE | Noted: 2019-07-07

## 2020-06-03 ENCOUNTER — APPOINTMENT (OUTPATIENT)
Dept: CARDIOLOGY | Facility: CLINIC | Age: 50
End: 2020-06-03

## 2020-09-24 NOTE — ED ADULT NURSE NOTE - PAIN RATING/NUMBER SCALE (0-10): REST
9

## 2020-10-01 ENCOUNTER — OUTPATIENT (OUTPATIENT)
Dept: OUTPATIENT SERVICES | Facility: HOSPITAL | Age: 50
LOS: 1 days | End: 2020-10-01
Payer: MEDICAID

## 2020-10-01 DIAGNOSIS — Z98.84 BARIATRIC SURGERY STATUS: Chronic | ICD-10-CM

## 2020-10-01 DIAGNOSIS — Z90.49 ACQUIRED ABSENCE OF OTHER SPECIFIED PARTS OF DIGESTIVE TRACT: Chronic | ICD-10-CM

## 2020-10-03 ENCOUNTER — EMERGENCY (EMERGENCY)
Facility: HOSPITAL | Age: 50
LOS: 1 days | End: 2020-10-03
Attending: EMERGENCY MEDICINE
Payer: MEDICAID

## 2020-10-03 VITALS
OXYGEN SATURATION: 100 % | RESPIRATION RATE: 20 BRPM | HEART RATE: 62 BPM | DIASTOLIC BLOOD PRESSURE: 77 MMHG | SYSTOLIC BLOOD PRESSURE: 131 MMHG

## 2020-10-03 VITALS
WEIGHT: 238.1 LBS | HEIGHT: 66 IN | SYSTOLIC BLOOD PRESSURE: 154 MMHG | HEART RATE: 70 BPM | DIASTOLIC BLOOD PRESSURE: 97 MMHG | OXYGEN SATURATION: 100 % | RESPIRATION RATE: 19 BRPM | TEMPERATURE: 98 F

## 2020-10-03 DIAGNOSIS — Z90.49 ACQUIRED ABSENCE OF OTHER SPECIFIED PARTS OF DIGESTIVE TRACT: Chronic | ICD-10-CM

## 2020-10-03 DIAGNOSIS — Z98.84 BARIATRIC SURGERY STATUS: Chronic | ICD-10-CM

## 2020-10-03 LAB
ALBUMIN SERPL ELPH-MCNC: 4.1 G/DL — SIGNIFICANT CHANGE UP (ref 3.3–5)
ALP SERPL-CCNC: 100 U/L — SIGNIFICANT CHANGE UP (ref 40–120)
ALT FLD-CCNC: 8 U/L — LOW (ref 10–45)
ANION GAP SERPL CALC-SCNC: 10 MMOL/L — SIGNIFICANT CHANGE UP (ref 5–17)
APPEARANCE UR: CLEAR — SIGNIFICANT CHANGE UP
AST SERPL-CCNC: 12 U/L — SIGNIFICANT CHANGE UP (ref 10–40)
BACTERIA # UR AUTO: ABNORMAL
BASOPHILS # BLD AUTO: 0.05 K/UL — SIGNIFICANT CHANGE UP (ref 0–0.2)
BASOPHILS NFR BLD AUTO: 0.6 % — SIGNIFICANT CHANGE UP (ref 0–2)
BILIRUB SERPL-MCNC: 0.2 MG/DL — SIGNIFICANT CHANGE UP (ref 0.2–1.2)
BILIRUB UR-MCNC: NEGATIVE — SIGNIFICANT CHANGE UP
BUN SERPL-MCNC: 9 MG/DL — SIGNIFICANT CHANGE UP (ref 7–23)
CALCIUM SERPL-MCNC: 9.1 MG/DL — SIGNIFICANT CHANGE UP (ref 8.4–10.5)
CHLORIDE SERPL-SCNC: 103 MMOL/L — SIGNIFICANT CHANGE UP (ref 96–108)
CO2 SERPL-SCNC: 24 MMOL/L — SIGNIFICANT CHANGE UP (ref 22–31)
COLOR SPEC: SIGNIFICANT CHANGE UP
CREAT SERPL-MCNC: 0.74 MG/DL — SIGNIFICANT CHANGE UP (ref 0.5–1.3)
D DIMER BLD IA.RAPID-MCNC: 720 NG/ML DDU — HIGH
DIFF PNL FLD: ABNORMAL
EOSINOPHIL # BLD AUTO: 0.21 K/UL — SIGNIFICANT CHANGE UP (ref 0–0.5)
EOSINOPHIL NFR BLD AUTO: 2.3 % — SIGNIFICANT CHANGE UP (ref 0–6)
EPI CELLS # UR: 4 — SIGNIFICANT CHANGE UP
GAS PNL BLDV: SIGNIFICANT CHANGE UP
GLUCOSE SERPL-MCNC: 102 MG/DL — HIGH (ref 70–99)
GLUCOSE UR QL: NEGATIVE — SIGNIFICANT CHANGE UP
HCT VFR BLD CALC: 33 % — LOW (ref 34.5–45)
HGB BLD-MCNC: 9.7 G/DL — LOW (ref 11.5–15.5)
HIV 1 & 2 AB SERPL IA.RAPID: SIGNIFICANT CHANGE UP
HYALINE CASTS # UR AUTO: 1 /LPF — SIGNIFICANT CHANGE UP (ref 0–7)
IMM GRANULOCYTES NFR BLD AUTO: 0.2 % — SIGNIFICANT CHANGE UP (ref 0–1.5)
KETONES UR-MCNC: NEGATIVE — SIGNIFICANT CHANGE UP
LEUKOCYTE ESTERASE UR-ACNC: NEGATIVE — SIGNIFICANT CHANGE UP
LIDOCAIN IGE QN: 22 U/L — SIGNIFICANT CHANGE UP (ref 7–60)
LYMPHOCYTES # BLD AUTO: 3.11 K/UL — SIGNIFICANT CHANGE UP (ref 1–3.3)
LYMPHOCYTES # BLD AUTO: 34.2 % — SIGNIFICANT CHANGE UP (ref 13–44)
MCHC RBC-ENTMCNC: 23 PG — LOW (ref 27–34)
MCHC RBC-ENTMCNC: 29.4 GM/DL — LOW (ref 32–36)
MCV RBC AUTO: 78.4 FL — LOW (ref 80–100)
MONOCYTES # BLD AUTO: 0.7 K/UL — SIGNIFICANT CHANGE UP (ref 0–0.9)
MONOCYTES NFR BLD AUTO: 7.7 % — SIGNIFICANT CHANGE UP (ref 2–14)
NEUTROPHILS # BLD AUTO: 5 K/UL — SIGNIFICANT CHANGE UP (ref 1.8–7.4)
NEUTROPHILS NFR BLD AUTO: 55 % — SIGNIFICANT CHANGE UP (ref 43–77)
NITRITE UR-MCNC: NEGATIVE — SIGNIFICANT CHANGE UP
NRBC # BLD: 0 /100 WBCS — SIGNIFICANT CHANGE UP (ref 0–0)
NT-PROBNP SERPL-SCNC: 70 PG/ML — SIGNIFICANT CHANGE UP (ref 0–300)
PH UR: 6 — SIGNIFICANT CHANGE UP (ref 5–8)
PLATELET # BLD AUTO: 478 K/UL — HIGH (ref 150–400)
POTASSIUM SERPL-MCNC: 3.9 MMOL/L — SIGNIFICANT CHANGE UP (ref 3.5–5.3)
POTASSIUM SERPL-SCNC: 3.9 MMOL/L — SIGNIFICANT CHANGE UP (ref 3.5–5.3)
PROT SERPL-MCNC: 7.6 G/DL — SIGNIFICANT CHANGE UP (ref 6–8.3)
PROT UR-MCNC: ABNORMAL
RBC # BLD: 4.21 M/UL — SIGNIFICANT CHANGE UP (ref 3.8–5.2)
RBC # FLD: 15.9 % — HIGH (ref 10.3–14.5)
RBC CASTS # UR COMP ASSIST: 8 /HPF — HIGH (ref 0–4)
SARS-COV-2 RNA SPEC QL NAA+PROBE: SIGNIFICANT CHANGE UP
SODIUM SERPL-SCNC: 137 MMOL/L — SIGNIFICANT CHANGE UP (ref 135–145)
SP GR SPEC: 1.03 — HIGH (ref 1.01–1.02)
TROPONIN T, HIGH SENSITIVITY RESULT: <6 NG/L — SIGNIFICANT CHANGE UP (ref 0–51)
TROPONIN T, HIGH SENSITIVITY RESULT: <6 NG/L — SIGNIFICANT CHANGE UP (ref 0–51)
UROBILINOGEN FLD QL: NEGATIVE — SIGNIFICANT CHANGE UP
WBC # BLD: 9.09 K/UL — SIGNIFICANT CHANGE UP (ref 3.8–10.5)
WBC # FLD AUTO: 9.09 K/UL — SIGNIFICANT CHANGE UP (ref 3.8–10.5)
WBC UR QL: 2 /HPF — SIGNIFICANT CHANGE UP (ref 0–5)

## 2020-10-03 PROCEDURE — 71275 CT ANGIOGRAPHY CHEST: CPT | Mod: 26

## 2020-10-03 PROCEDURE — 82803 BLOOD GASES ANY COMBINATION: CPT

## 2020-10-03 PROCEDURE — 74177 CT ABD & PELVIS W/CONTRAST: CPT | Mod: 26

## 2020-10-03 PROCEDURE — 71275 CT ANGIOGRAPHY CHEST: CPT

## 2020-10-03 PROCEDURE — 73030 X-RAY EXAM OF SHOULDER: CPT

## 2020-10-03 PROCEDURE — 85018 HEMOGLOBIN: CPT

## 2020-10-03 PROCEDURE — 82435 ASSAY OF BLOOD CHLORIDE: CPT

## 2020-10-03 PROCEDURE — 93005 ELECTROCARDIOGRAM TRACING: CPT

## 2020-10-03 PROCEDURE — U0003: CPT

## 2020-10-03 PROCEDURE — 81001 URINALYSIS AUTO W/SCOPE: CPT

## 2020-10-03 PROCEDURE — 82947 ASSAY GLUCOSE BLOOD QUANT: CPT

## 2020-10-03 PROCEDURE — 81025 URINE PREGNANCY TEST: CPT

## 2020-10-03 PROCEDURE — 80053 COMPREHEN METABOLIC PANEL: CPT

## 2020-10-03 PROCEDURE — 86703 HIV-1/HIV-2 1 RESULT ANTBDY: CPT

## 2020-10-03 PROCEDURE — 82330 ASSAY OF CALCIUM: CPT

## 2020-10-03 PROCEDURE — 96375 TX/PRO/DX INJ NEW DRUG ADDON: CPT | Mod: XU

## 2020-10-03 PROCEDURE — 99285 EMERGENCY DEPT VISIT HI MDM: CPT

## 2020-10-03 PROCEDURE — 74177 CT ABD & PELVIS W/CONTRAST: CPT

## 2020-10-03 PROCEDURE — 93010 ELECTROCARDIOGRAM REPORT: CPT

## 2020-10-03 PROCEDURE — 93971 EXTREMITY STUDY: CPT | Mod: 26

## 2020-10-03 PROCEDURE — 84484 ASSAY OF TROPONIN QUANT: CPT

## 2020-10-03 PROCEDURE — 83690 ASSAY OF LIPASE: CPT

## 2020-10-03 PROCEDURE — 83605 ASSAY OF LACTIC ACID: CPT

## 2020-10-03 PROCEDURE — 83880 ASSAY OF NATRIURETIC PEPTIDE: CPT

## 2020-10-03 PROCEDURE — 96374 THER/PROPH/DIAG INJ IV PUSH: CPT | Mod: XU

## 2020-10-03 PROCEDURE — 93971 EXTREMITY STUDY: CPT

## 2020-10-03 PROCEDURE — 84295 ASSAY OF SERUM SODIUM: CPT

## 2020-10-03 PROCEDURE — 73030 X-RAY EXAM OF SHOULDER: CPT | Mod: 26,LT

## 2020-10-03 PROCEDURE — 85025 COMPLETE CBC W/AUTO DIFF WBC: CPT

## 2020-10-03 PROCEDURE — 99284 EMERGENCY DEPT VISIT MOD MDM: CPT | Mod: 25

## 2020-10-03 PROCEDURE — 85379 FIBRIN DEGRADATION QUANT: CPT

## 2020-10-03 PROCEDURE — 84132 ASSAY OF SERUM POTASSIUM: CPT

## 2020-10-03 PROCEDURE — 87086 URINE CULTURE/COLONY COUNT: CPT

## 2020-10-03 PROCEDURE — 85014 HEMATOCRIT: CPT

## 2020-10-03 RX ORDER — ACETAMINOPHEN 500 MG
975 TABLET ORAL ONCE
Refills: 0 | Status: COMPLETED | OUTPATIENT
Start: 2020-10-03 | End: 2020-10-03

## 2020-10-03 RX ORDER — KETOROLAC TROMETHAMINE 30 MG/ML
15 SYRINGE (ML) INJECTION ONCE
Refills: 0 | Status: DISCONTINUED | OUTPATIENT
Start: 2020-10-03 | End: 2020-10-03

## 2020-10-03 RX ORDER — SODIUM CHLORIDE 9 MG/ML
1000 INJECTION INTRAMUSCULAR; INTRAVENOUS; SUBCUTANEOUS ONCE
Refills: 0 | Status: COMPLETED | OUTPATIENT
Start: 2020-10-03 | End: 2020-10-03

## 2020-10-03 RX ORDER — ONDANSETRON 8 MG/1
4 TABLET, FILM COATED ORAL ONCE
Refills: 0 | Status: COMPLETED | OUTPATIENT
Start: 2020-10-03 | End: 2020-10-03

## 2020-10-03 RX ADMIN — Medication 975 MILLIGRAM(S): at 03:16

## 2020-10-03 RX ADMIN — ONDANSETRON 4 MILLIGRAM(S): 8 TABLET, FILM COATED ORAL at 03:17

## 2020-10-03 RX ADMIN — Medication 15 MILLIGRAM(S): at 12:17

## 2020-10-03 RX ADMIN — Medication 975 MILLIGRAM(S): at 11:41

## 2020-10-03 RX ADMIN — SODIUM CHLORIDE 1000 MILLILITER(S): 9 INJECTION INTRAMUSCULAR; INTRAVENOUS; SUBCUTANEOUS at 03:16

## 2020-10-03 NOTE — ED PROVIDER NOTE - PATIENT PORTAL LINK FT
You can access the FollowMyHealth Patient Portal offered by [facility] by registering at the following website: [email or web address]. By joining [employer]s FollowMyHealth portal, you will also be able to view your health information using other applications (apps) compatible with our system.

## 2020-10-03 NOTE — ED PROVIDER NOTE - ATTENDING CONTRIBUTION TO CARE
MD Chase:  patient seen and evaluated personally.   I agree with the History & Physical,  Impression & Plan other than what was detailed in my note.  MD Chase  51 y/o f hx of gastric sleeve, covid pos in march presenting w/ multiple complaints, cc is left arm pain, started two days ago, pt does not remember any trauma but does help with her gmother which she has been moving around, pain is moderate, radiating down to elbow, has not had before, no prior injries, no redness swelling noted, also reports cp started around two days ago, central worse with walking, no hx of dvt/pe, no leg swelling noted, pt also has mild epigastric pain that has had over the past few weeks that has not changed significantly. afebrile vitals stable, well appearing, non toxic, lungs cta b/l, no w/r/r, abd soft nt, no epigastric or ruq ttp. no leg ttp, pt has ttp over l trap, also has ttp over biceps insertion point, liited rom of shoulder d/t pain, no erythema, no effusion, symetrical, pulses intact b/l, warm, comp soft, from of elbow wrist. not consistent w/ septic joint. radha brandt, however given chest pain, and arm pain, w/ recent covid and sig pain, do feel further workup is warranted. will ct chest abd pelvis. also d dimer to r/o dvt in arm. if pos will need upper extrem dvt. ct help screen for thoracic outlet syndrome

## 2020-10-03 NOTE — ED PROVIDER NOTE - PROGRESS NOTE DETAILS
Rachel Cain PGY3  labs neg, imaging neg, US neg for DVT. patient having HA, given tylenol and toradol. will dc with pain control, supportive care, and pmd f/u. HD and clinically stable for dc

## 2020-10-03 NOTE — ED ADULT TRIAGE NOTE - CHIEF COMPLAINT QUOTE
left arm pain x 2 days; neck pain today; diarrhea x 2 days; umbilical pain today  c/o shortness of breath today  COVID positive in March and antibody positive in May

## 2020-10-03 NOTE — ED PROVIDER NOTE - NS ED ROS FT
CONSTITUTIONAL: No fevers, no lightheadedness, no dizziness  EYES: no visual changes, no eye pain  EARS: no ear drainage, no ear pain, no change in hearing  NOSE: no nasal congestion  MOUTH/THROAT: no sore throat  CV: no palpitations  RESP: no cough  GI: No n/v  : no dysuria, no hematuria, no flank pain  MSK: no back pain  SKIN: no rashes  NEURO: no headache, no focal weakness, no decreased sensation/parasthesias   PSYCHIATRIC: no known mental health issues.

## 2020-10-03 NOTE — ED ADULT NURSE NOTE - OBJECTIVE STATEMENT
patient came in having multiple complaints of upper back pain, left arm pain,throat & neck pain, umbilical pain, diarrhea x2 days. As per patient she was see by PMD & covid tested. No  results yet. Hx of covid (+) last March & antibodies(+) last May.

## 2020-10-03 NOTE — ED PROVIDER NOTE - CLINICAL SUMMARY MEDICAL DECISION MAKING FREE TEXT BOX
Constitution of viral symptoms but w/ abd ttp and hx gastric sleeve must CT to r/o intraabd pathology. Exertional cp will r/o acs and liekly at least obs for stress test. Concern for PE/UE DVT given slightly darker appearance of hand and will CTA and obs for UE DVT study if dimer elevated.

## 2020-10-03 NOTE — ED ADULT NURSE NOTE - IS THE PATIENT ABLE TO BE SCREENED?
Spoke with Alida Lauren, patients daughter, patient is in denial of aging and patient refusing most services, patient's daughter is going through breast cancer treatment and is concerned and stressed with her mothers forgetfulness and other health care needs, willing to have a referral to home care, prepped for home safety evaluation, she will check into assisted living if insurance will help pay for, has difficulty reaching her mother sometimes she leaves the phone off the hook and does not answer the cell phone and does not know how to work the security system in her home to let anyone in, daughters have access with key fob, daughter wondering if her mom needs an anti-depressant? Yes

## 2020-10-03 NOTE — ED PROVIDER NOTE - OBJECTIVE STATEMENT
49yo F hx htn, gastric sleeve p/w 2 days of periumbilical pain w/ watery diarrhea, mild sob, chest tightness worse with exertion, chills, and L arm pain worse with movement mostly at upper arm. Denies trauma. No recent cardiac w/u. Was covid + in march.

## 2020-10-03 NOTE — ED PROVIDER NOTE - NSFOLLOWUPINSTRUCTIONS_ED_ALL_ED_FT
Activities as tolerated. Please encourage good oral and fluid intake. For pain, please take Motrin 400mg every 4 hours as needed, or Tylenol 650mg every 6 hours as needed.    Please see your primary care doctor within 24-48 hours for further management of your symptoms.    Please seek emergent medical management if you have any worsening signs or symptoms, such as worsening pain, chest pain, difficulty breathing, loss of consciousness, or persistent vomiting.

## 2020-10-03 NOTE — ED PROVIDER NOTE - PHYSICAL EXAMINATION
Gen: Well appearing, NAD  Head: NCAT  HEENT: PERRL, MMM, normal conjunctiva, anicteric, neck supple  Lung: CTAB, no adventitious sounds  CV: RRR, no murmurs, 2+ peripheral pulses in b/l UE extremities  Abd: soft, mildly ttp periumbilical, no rebound or guarding, no CVAT  MSK: No edema, no visible deformities, passive ROM of LUE itnact but limited active ROM 2/2 pain, UE mildly ttp over deltoid  Neuro: Moving all extremity grossly  Skin: Warm and dry, no evidence of rash, L hand mildly darker than R  Psych: normal mood and affect

## 2020-10-04 LAB
CULTURE RESULTS: SIGNIFICANT CHANGE UP
SPECIMEN SOURCE: SIGNIFICANT CHANGE UP

## 2020-10-05 NOTE — ED POST DISCHARGE NOTE - OTHER COMMUNICATION
20/5/20: Pt returned call, informed her of +UCx result as above. Pt endorsing mild frequency but no dysuria, had outpt negative cx at her PCP's office within the last 3 days. Advised if frequency persists to f/u with PCP again for repeat sample to ensure no infection. Pt acknowledged understanding, appreciative of call. - Tyson Wiley PA-C

## 2020-10-05 NOTE — ED POST DISCHARGE NOTE - DETAILS
10/5/20: Attempted to contact pt regarding ucx results as above. No answer, left voice message for pt to call back admin line. - Tyson Wiley PA-C

## 2020-10-06 DIAGNOSIS — Z71.89 OTHER SPECIFIED COUNSELING: ICD-10-CM

## 2021-06-05 ENCOUNTER — EMERGENCY (EMERGENCY)
Facility: HOSPITAL | Age: 51
LOS: 1 days | Discharge: HOME | End: 2021-06-05
Attending: EMERGENCY MEDICINE | Admitting: EMERGENCY MEDICINE
Payer: MEDICAID

## 2021-06-05 VITALS
RESPIRATION RATE: 17 BRPM | HEART RATE: 70 BPM | OXYGEN SATURATION: 100 % | SYSTOLIC BLOOD PRESSURE: 137 MMHG | DIASTOLIC BLOOD PRESSURE: 75 MMHG | TEMPERATURE: 98 F

## 2021-06-05 VITALS
RESPIRATION RATE: 20 BRPM | HEIGHT: 66 IN | OXYGEN SATURATION: 100 % | HEART RATE: 89 BPM | SYSTOLIC BLOOD PRESSURE: 143 MMHG | DIASTOLIC BLOOD PRESSURE: 92 MMHG | TEMPERATURE: 98 F

## 2021-06-05 DIAGNOSIS — Z90.49 ACQUIRED ABSENCE OF OTHER SPECIFIED PARTS OF DIGESTIVE TRACT: Chronic | ICD-10-CM

## 2021-06-05 DIAGNOSIS — Z98.84 BARIATRIC SURGERY STATUS: Chronic | ICD-10-CM

## 2021-06-05 LAB
ALBUMIN SERPL ELPH-MCNC: 3.8 G/DL — SIGNIFICANT CHANGE UP (ref 3.3–5)
ALP SERPL-CCNC: 114 U/L — SIGNIFICANT CHANGE UP (ref 40–120)
ALT FLD-CCNC: 8 U/L — SIGNIFICANT CHANGE UP (ref 4–33)
ANION GAP SERPL CALC-SCNC: 10 MMOL/L — SIGNIFICANT CHANGE UP (ref 7–14)
APPEARANCE UR: ABNORMAL
AST SERPL-CCNC: 10 U/L — SIGNIFICANT CHANGE UP (ref 4–32)
BACTERIA # UR AUTO: ABNORMAL
BASOPHILS # BLD AUTO: 0.04 K/UL — SIGNIFICANT CHANGE UP (ref 0–0.2)
BASOPHILS NFR BLD AUTO: 0.5 % — SIGNIFICANT CHANGE UP (ref 0–2)
BILIRUB SERPL-MCNC: <0.2 MG/DL — SIGNIFICANT CHANGE UP (ref 0.2–1.2)
BILIRUB UR-MCNC: NEGATIVE — SIGNIFICANT CHANGE UP
BUN SERPL-MCNC: 12 MG/DL — SIGNIFICANT CHANGE UP (ref 7–23)
CALCIUM SERPL-MCNC: 8.7 MG/DL — SIGNIFICANT CHANGE UP (ref 8.4–10.5)
CHLORIDE SERPL-SCNC: 105 MMOL/L — SIGNIFICANT CHANGE UP (ref 98–107)
CO2 SERPL-SCNC: 23 MMOL/L — SIGNIFICANT CHANGE UP (ref 22–31)
COD CRY URNS QL: ABNORMAL
COLOR SPEC: YELLOW — SIGNIFICANT CHANGE UP
CREAT SERPL-MCNC: 0.87 MG/DL — SIGNIFICANT CHANGE UP (ref 0.5–1.3)
DIFF PNL FLD: ABNORMAL
EOSINOPHIL # BLD AUTO: 0.3 K/UL — SIGNIFICANT CHANGE UP (ref 0–0.5)
EOSINOPHIL NFR BLD AUTO: 3.4 % — SIGNIFICANT CHANGE UP (ref 0–6)
EPI CELLS # UR: 4 /HPF — SIGNIFICANT CHANGE UP (ref 0–5)
GLUCOSE SERPL-MCNC: 98 MG/DL — SIGNIFICANT CHANGE UP (ref 70–99)
GLUCOSE UR QL: NEGATIVE — SIGNIFICANT CHANGE UP
HCG SERPL-ACNC: <5 MIU/ML — SIGNIFICANT CHANGE UP
HCT VFR BLD CALC: 30.9 % — LOW (ref 34.5–45)
HGB BLD-MCNC: 9.5 G/DL — LOW (ref 11.5–15.5)
HYALINE CASTS # UR AUTO: 0 /LPF — SIGNIFICANT CHANGE UP (ref 0–7)
IANC: 4.25 K/UL — SIGNIFICANT CHANGE UP (ref 1.5–8.5)
IMM GRANULOCYTES NFR BLD AUTO: 0.2 % — SIGNIFICANT CHANGE UP (ref 0–1.5)
KETONES UR-MCNC: NEGATIVE — SIGNIFICANT CHANGE UP
LEUKOCYTE ESTERASE UR-ACNC: NEGATIVE — SIGNIFICANT CHANGE UP
LIDOCAIN IGE QN: 29 U/L — SIGNIFICANT CHANGE UP (ref 7–60)
LYMPHOCYTES # BLD AUTO: 3.44 K/UL — HIGH (ref 1–3.3)
LYMPHOCYTES # BLD AUTO: 38.8 % — SIGNIFICANT CHANGE UP (ref 13–44)
MCHC RBC-ENTMCNC: 24.1 PG — LOW (ref 27–34)
MCHC RBC-ENTMCNC: 30.7 GM/DL — LOW (ref 32–36)
MCV RBC AUTO: 78.2 FL — LOW (ref 80–100)
MONOCYTES # BLD AUTO: 0.81 K/UL — SIGNIFICANT CHANGE UP (ref 0–0.9)
MONOCYTES NFR BLD AUTO: 9.1 % — SIGNIFICANT CHANGE UP (ref 2–14)
NEUTROPHILS # BLD AUTO: 4.25 K/UL — SIGNIFICANT CHANGE UP (ref 1.8–7.4)
NEUTROPHILS NFR BLD AUTO: 48 % — SIGNIFICANT CHANGE UP (ref 43–77)
NITRITE UR-MCNC: NEGATIVE — SIGNIFICANT CHANGE UP
NRBC # BLD: 0 /100 WBCS — SIGNIFICANT CHANGE UP
NRBC # FLD: 0 K/UL — SIGNIFICANT CHANGE UP
PH UR: 6 — SIGNIFICANT CHANGE UP (ref 5–8)
PLATELET # BLD AUTO: 448 K/UL — HIGH (ref 150–400)
POTASSIUM SERPL-MCNC: 4.2 MMOL/L — SIGNIFICANT CHANGE UP (ref 3.5–5.3)
POTASSIUM SERPL-SCNC: 4.2 MMOL/L — SIGNIFICANT CHANGE UP (ref 3.5–5.3)
PROT SERPL-MCNC: 7.3 G/DL — SIGNIFICANT CHANGE UP (ref 6–8.3)
PROT UR-MCNC: ABNORMAL
RBC # BLD: 3.95 M/UL — SIGNIFICANT CHANGE UP (ref 3.8–5.2)
RBC # FLD: 14.9 % — HIGH (ref 10.3–14.5)
RBC CASTS # UR COMP ASSIST: 8 /HPF — HIGH (ref 0–4)
SODIUM SERPL-SCNC: 138 MMOL/L — SIGNIFICANT CHANGE UP (ref 135–145)
SP GR SPEC: 1.03 — HIGH (ref 1.01–1.02)
TROPONIN T, HIGH SENSITIVITY RESULT: 6 NG/L — SIGNIFICANT CHANGE UP
TROPONIN T, HIGH SENSITIVITY RESULT: 9 NG/L — SIGNIFICANT CHANGE UP
UROBILINOGEN FLD QL: SIGNIFICANT CHANGE UP
WBC # BLD: 8.86 K/UL — SIGNIFICANT CHANGE UP (ref 3.8–10.5)
WBC # FLD AUTO: 8.86 K/UL — SIGNIFICANT CHANGE UP (ref 3.8–10.5)
WBC UR QL: 3 /HPF — SIGNIFICANT CHANGE UP (ref 0–5)

## 2021-06-05 PROCEDURE — 71046 X-RAY EXAM CHEST 2 VIEWS: CPT | Mod: 26

## 2021-06-05 PROCEDURE — 74177 CT ABD & PELVIS W/CONTRAST: CPT | Mod: 26

## 2021-06-05 PROCEDURE — 99284 EMERGENCY DEPT VISIT MOD MDM: CPT

## 2021-06-05 RX ORDER — KETOROLAC TROMETHAMINE 30 MG/ML
15 SYRINGE (ML) INJECTION ONCE
Refills: 0 | Status: DISCONTINUED | OUTPATIENT
Start: 2021-06-05 | End: 2021-06-05

## 2021-06-05 RX ORDER — METOCLOPRAMIDE HCL 10 MG
10 TABLET ORAL ONCE
Refills: 0 | Status: COMPLETED | OUTPATIENT
Start: 2021-06-05 | End: 2021-06-05

## 2021-06-05 RX ORDER — SODIUM CHLORIDE 9 MG/ML
1000 INJECTION INTRAMUSCULAR; INTRAVENOUS; SUBCUTANEOUS ONCE
Refills: 0 | Status: COMPLETED | OUTPATIENT
Start: 2021-06-05 | End: 2021-06-05

## 2021-06-05 RX ORDER — FAMOTIDINE 10 MG/ML
20 INJECTION INTRAVENOUS ONCE
Refills: 0 | Status: COMPLETED | OUTPATIENT
Start: 2021-06-05 | End: 2021-06-05

## 2021-06-05 RX ADMIN — SODIUM CHLORIDE 2000 MILLILITER(S): 9 INJECTION INTRAMUSCULAR; INTRAVENOUS; SUBCUTANEOUS at 19:39

## 2021-06-05 RX ADMIN — FAMOTIDINE 20 MILLIGRAM(S): 10 INJECTION INTRAVENOUS at 19:39

## 2021-06-05 RX ADMIN — Medication 15 MILLIGRAM(S): at 20:09

## 2021-06-05 RX ADMIN — Medication 10 MILLIGRAM(S): at 19:39

## 2021-06-05 NOTE — ED PROVIDER NOTE - CLINICAL SUMMARY MEDICAL DECISION MAKING FREE TEXT BOX
51 y/o F with CP, abd pain, neck pain since Wednesday.  1. Chest pain since Wed.  No CAD risk factors.  ECG reviewed.  Will check troponin, CXR.  No SOB or signs of PE and started prior to train ride.  No CTPA indicated.  2. Abd pain, vomiting and diarrhea.  Epig tenderness.  Not acute abdomen.  Likely GI etiology - GERD/gastritis/gastroenteritis.  Hx gastric sleeve.  Will order CT abd/pelvis to eval. Pepcid, reglan.  No urinary complaints.  will check U/A and HCG.  3. Neck pain.  Likely musculoskeletal.  Toradol.  Re-eval.

## 2021-06-05 NOTE — ED ADULT TRIAGE NOTE - AS HEIGHT TYPE
Recommend Virtual Visit (Phone visit, Video visit)  Due for fasting labs and blood pressure check with ancillary    Chris Rapp PA-C       stated

## 2021-06-05 NOTE — ED PROVIDER NOTE - PHYSICAL EXAMINATION
ATTENDING PHYSICAL EXAM  GEN - NAD; well appearing; A+O x3, RR 12reg, O2 99%RA.  HEAD - NC/AT; EYES/NOSE - PERRL, EOMI, mucous membranes moist, no discharge; THROAT: Oral cavity and pharynx normal. No inflammation, swelling, exudate, or lesions  NECK: Neck supple, non-tender without lymphadenopathy, no vert tenderness, FROM, no masses, no JVD  PULMONARY - CTA b/l, symmetric breath sounds, no w/r/r  CARDIAC -s1s2, RRR, no M,R,G  ABDOMEN - mildly obese, +NABS, ND, + tenderness in epigastric area only, no other TTP, soft, no guarding, no rebound, no masses   BACK - no CVA tenderness, No vertebral or paravertebral tenderness  EXTREMITIES - symmetric pulses, 2+ dp, capillary refill < 2 seconds, no clubbing, no cyanosis, no edema  SKIN - no rash or bruising   NEUROLOGIC - alert, CN 2-12 intact, no focal deficits

## 2021-06-05 NOTE — ED ADULT NURSE NOTE - OBJECTIVE STATEMENT
Pt alert x4 c/c general diffuse abdominal pain, headache, dizziness 4xdays worsening today. pt recent travel from [place] via train. Breathing even unlabored at rest .abdomen nondistended. tender upon palpation. denies dysuria, hematuria blood in stools.

## 2021-06-05 NOTE — ED PROVIDER NOTE - NSFOLLOWUPINSTRUCTIONS_ED_ALL_ED_FT
Take antibiotic as prescribed:  cipro 1 tab every 12 hours x 10 days.  flagyl 1 tab every 8 hours x 10 days.  Follow up with your doctor within 3 days for re-evaluation.  Return to ER immediately for any fever, increased pain, vomiting, or further concern.

## 2021-06-05 NOTE — ED ADULT TRIAGE NOTE - CHIEF COMPLAINT QUOTE
pt traveled by Train from [place] arrive on Wednesday stated pain to getachew. neck pain, band type HA, ABD pain/V/D/chest tightens/SOB , pt stated had rapid COVID test done 6/4/21=Neg.    pt was COVID + back in March 2020,    Ist dose of COVID 19 Vacc.  [facility] on May 15, 2021. pt traveled by Train from [place] arrive on Wednesday stated pain to getachew. neck area, HA, ABD pain/V/D/chest tightens/SOB , pt stated had rapid COVID test done 6/4/21=Neg.    pt was COVID + back in March 2020,    Ist dose of COVID 19 Vacc.  [facility] on May 15, 2021.

## 2021-06-05 NOTE — ED ADULT NURSE NOTE - CAS DISCH ACCOMP BY
labia. Cervix exhibits no motion tenderness. Right adnexum displays no mass, no tenderness and no fullness. Left adnexum displays no mass, no tenderness and no fullness. Musculoskeletal: Normal range of motion. She exhibits no edema or tenderness. Lymphadenopathy:     She has no cervical adenopathy. She has no axillary adenopathy. Neurological: She is alert and oriented to person, place, and time. Skin: Skin is warm and dry. Psychiatric: She has a normal mood and affect. Her behavior is normal. Judgment and thought content normal.             Assessment  1. Well woman exam with routine gynecological exam  PAP SMEAR   2. Screening for cervical cancer  PAP SMEAR       Plan   1. Pap collected  2. Mammogram UTD  3.   Return in 1 year
Self

## 2021-06-05 NOTE — ED PROVIDER NOTE - PROGRESS NOTE DETAILS
Patient re-assessed.  Feels better.  All results reviewed.  CT + diverticulitis. Discussed with patient. Patient without any high risk indications for admission.  Tolerating PO.  Stable for discharge.  Given first dose cipro/flagyl.  Asked to be given enough medicine for tomorrow since her pharmacy closed on Sunday.  Return precautions discussed.  Understands need for PMD f/u within 3 days.

## 2021-06-05 NOTE — ED ADULT NURSE NOTE - CHIEF COMPLAINT QUOTE
pt traveled by Train from [place] arrive on Wednesday stated pain to getachew. neck area, HA, ABD pain/V/D/chest tightens/SOB , pt stated had rapid COVID test done 6/4/21=Neg.    pt was COVID + back in March 2020,    Ist dose of COVID 19 Vacc.  [facility] on May 15, 2021.

## 2021-06-05 NOTE — ED PROVIDER NOTE - PATIENT PORTAL LINK FT
You can access the FollowMyHealth Patient Portal offered by [facility] by registering at the following website: [email or web address]. By joining [employer]’s FollowMyHealth portal, you will also be able to view your health information using other applications (apps) compatible with our system.

## 2021-06-06 PROBLEM — I10 ESSENTIAL (PRIMARY) HYPERTENSION: Chronic | Status: ACTIVE | Noted: 2020-10-03

## 2021-06-06 RX ORDER — METRONIDAZOLE 500 MG
1 TABLET ORAL
Qty: 30 | Refills: 0
Start: 2021-06-06 | End: 2021-06-15

## 2021-06-06 RX ORDER — METRONIDAZOLE 500 MG
500 TABLET ORAL THREE TIMES A DAY
Refills: 0 | Status: DISCONTINUED | OUTPATIENT
Start: 2021-06-06 | End: 2021-06-09

## 2021-06-06 RX ORDER — CIPROFLOXACIN LACTATE 400MG/40ML
500 VIAL (ML) INTRAVENOUS ONCE
Refills: 0 | Status: COMPLETED | OUTPATIENT
Start: 2021-06-06 | End: 2021-06-06

## 2021-06-06 RX ORDER — CIPROFLOXACIN LACTATE 400MG/40ML
500 VIAL (ML) INTRAVENOUS EVERY 12 HOURS
Refills: 0 | Status: DISCONTINUED | OUTPATIENT
Start: 2021-06-06 | End: 2021-06-09

## 2021-06-06 RX ORDER — MOXIFLOXACIN HYDROCHLORIDE TABLETS, 400 MG 400 MG/1
1 TABLET, FILM COATED ORAL
Qty: 20 | Refills: 0
Start: 2021-06-06 | End: 2021-06-15

## 2021-06-06 RX ORDER — METRONIDAZOLE 500 MG
500 TABLET ORAL ONCE
Refills: 0 | Status: COMPLETED | OUTPATIENT
Start: 2021-06-06 | End: 2021-06-06

## 2021-06-06 RX ADMIN — Medication 500 MILLIGRAM(S): at 00:49

## 2021-06-06 RX ADMIN — Medication 500 MILLIGRAM(S): at 00:41

## 2021-06-06 RX ADMIN — Medication 500 MILLIGRAM(S): at 00:55

## 2021-08-06 ENCOUNTER — EMERGENCY (EMERGENCY)
Facility: HOSPITAL | Age: 51
LOS: 1 days | Discharge: HOME | End: 2021-08-06
Attending: EMERGENCY MEDICINE | Admitting: EMERGENCY MEDICINE
Payer: MEDICAID

## 2021-08-06 VITALS
HEART RATE: 109 BPM | SYSTOLIC BLOOD PRESSURE: 127 MMHG | HEIGHT: 66 IN | OXYGEN SATURATION: 98 % | TEMPERATURE: 98 F | RESPIRATION RATE: 16 BRPM | DIASTOLIC BLOOD PRESSURE: 75 MMHG

## 2021-08-06 DIAGNOSIS — Z90.49 ACQUIRED ABSENCE OF OTHER SPECIFIED PARTS OF DIGESTIVE TRACT: Chronic | ICD-10-CM

## 2021-08-06 DIAGNOSIS — Z98.84 BARIATRIC SURGERY STATUS: Chronic | ICD-10-CM

## 2021-08-06 PROCEDURE — 99285 EMERGENCY DEPT VISIT HI MDM: CPT

## 2021-08-06 NOTE — ED ADULT TRIAGE NOTE - CHIEF COMPLAINT QUOTE
epigastric pain/abdominal pain with body aches started 3 days ago along with dizziness and blurry vision and headache x 3 days along with nausea, no vomiting. Patient has some shortness of breath, feels tight in chest. PMH, anemia, migraines. patient had covid vaccine on 6/12/21 2nd dose.

## 2021-08-07 VITALS
TEMPERATURE: 98 F | DIASTOLIC BLOOD PRESSURE: 87 MMHG | RESPIRATION RATE: 16 BRPM | HEART RATE: 99 BPM | OXYGEN SATURATION: 100 % | SYSTOLIC BLOOD PRESSURE: 133 MMHG

## 2021-08-07 LAB
ALBUMIN SERPL ELPH-MCNC: 3.8 G/DL — SIGNIFICANT CHANGE UP (ref 3.3–5)
ALP SERPL-CCNC: 113 U/L — SIGNIFICANT CHANGE UP (ref 40–120)
ALT FLD-CCNC: 26 U/L — SIGNIFICANT CHANGE UP (ref 4–33)
ANION GAP SERPL CALC-SCNC: 12 MMOL/L — SIGNIFICANT CHANGE UP (ref 7–14)
APPEARANCE UR: CLEAR — SIGNIFICANT CHANGE UP
AST SERPL-CCNC: 21 U/L — SIGNIFICANT CHANGE UP (ref 4–32)
BASOPHILS # BLD AUTO: 0.04 K/UL — SIGNIFICANT CHANGE UP (ref 0–0.2)
BASOPHILS NFR BLD AUTO: 0.4 % — SIGNIFICANT CHANGE UP (ref 0–2)
BILIRUB SERPL-MCNC: <0.2 MG/DL — SIGNIFICANT CHANGE UP (ref 0.2–1.2)
BILIRUB UR-MCNC: NEGATIVE — SIGNIFICANT CHANGE UP
BLOOD GAS VENOUS COMPREHENSIVE RESULT: SIGNIFICANT CHANGE UP
BUN SERPL-MCNC: 12 MG/DL — SIGNIFICANT CHANGE UP (ref 7–23)
CALCIUM SERPL-MCNC: 9.6 MG/DL — SIGNIFICANT CHANGE UP (ref 8.4–10.5)
CHLORIDE SERPL-SCNC: 104 MMOL/L — SIGNIFICANT CHANGE UP (ref 98–107)
CO2 SERPL-SCNC: 25 MMOL/L — SIGNIFICANT CHANGE UP (ref 22–31)
COLOR SPEC: YELLOW — SIGNIFICANT CHANGE UP
CREAT SERPL-MCNC: 0.65 MG/DL — SIGNIFICANT CHANGE UP (ref 0.5–1.3)
DIFF PNL FLD: ABNORMAL
EOSINOPHIL # BLD AUTO: 0.19 K/UL — SIGNIFICANT CHANGE UP (ref 0–0.5)
EOSINOPHIL NFR BLD AUTO: 2 % — SIGNIFICANT CHANGE UP (ref 0–6)
GLUCOSE SERPL-MCNC: 96 MG/DL — SIGNIFICANT CHANGE UP (ref 70–99)
GLUCOSE UR QL: NEGATIVE — SIGNIFICANT CHANGE UP
HCG SERPL-ACNC: <5 MIU/ML — SIGNIFICANT CHANGE UP
HCT VFR BLD CALC: 31.4 % — LOW (ref 34.5–45)
HGB BLD-MCNC: 9.6 G/DL — LOW (ref 11.5–15.5)
IANC: 5 K/UL — SIGNIFICANT CHANGE UP (ref 1.5–8.5)
IMM GRANULOCYTES NFR BLD AUTO: 0.2 % — SIGNIFICANT CHANGE UP (ref 0–1.5)
KETONES UR-MCNC: ABNORMAL
LEUKOCYTE ESTERASE UR-ACNC: NEGATIVE — SIGNIFICANT CHANGE UP
LIDOCAIN IGE QN: 29 U/L — SIGNIFICANT CHANGE UP (ref 7–60)
LYMPHOCYTES # BLD AUTO: 3.09 K/UL — SIGNIFICANT CHANGE UP (ref 1–3.3)
LYMPHOCYTES # BLD AUTO: 32.6 % — SIGNIFICANT CHANGE UP (ref 13–44)
MAGNESIUM SERPL-MCNC: 1.9 MG/DL — SIGNIFICANT CHANGE UP (ref 1.6–2.6)
MCHC RBC-ENTMCNC: 23 PG — LOW (ref 27–34)
MCHC RBC-ENTMCNC: 30.6 GM/DL — LOW (ref 32–36)
MCV RBC AUTO: 75.3 FL — LOW (ref 80–100)
MONOCYTES # BLD AUTO: 1.15 K/UL — HIGH (ref 0–0.9)
MONOCYTES NFR BLD AUTO: 12.1 % — SIGNIFICANT CHANGE UP (ref 2–14)
NEUTROPHILS # BLD AUTO: 5 K/UL — SIGNIFICANT CHANGE UP (ref 1.8–7.4)
NEUTROPHILS NFR BLD AUTO: 52.7 % — SIGNIFICANT CHANGE UP (ref 43–77)
NITRITE UR-MCNC: NEGATIVE — SIGNIFICANT CHANGE UP
NRBC # BLD: 0 /100 WBCS — SIGNIFICANT CHANGE UP
NRBC # FLD: 0 K/UL — SIGNIFICANT CHANGE UP
PH UR: 5.5 — SIGNIFICANT CHANGE UP (ref 5–8)
PHOSPHATE SERPL-MCNC: 4.5 MG/DL — SIGNIFICANT CHANGE UP (ref 2.5–4.5)
PLATELET # BLD AUTO: 424 K/UL — HIGH (ref 150–400)
POTASSIUM SERPL-MCNC: 4.2 MMOL/L — SIGNIFICANT CHANGE UP (ref 3.5–5.3)
POTASSIUM SERPL-SCNC: 4.2 MMOL/L — SIGNIFICANT CHANGE UP (ref 3.5–5.3)
PROT SERPL-MCNC: 7 G/DL — SIGNIFICANT CHANGE UP (ref 6–8.3)
PROT UR-MCNC: ABNORMAL
RBC # BLD: 4.17 M/UL — SIGNIFICANT CHANGE UP (ref 3.8–5.2)
RBC # FLD: 14.7 % — HIGH (ref 10.3–14.5)
SODIUM SERPL-SCNC: 141 MMOL/L — SIGNIFICANT CHANGE UP (ref 135–145)
SP GR SPEC: 1.03 — HIGH (ref 1.01–1.02)
TROPONIN T, HIGH SENSITIVITY RESULT: 10 NG/L — SIGNIFICANT CHANGE UP
UROBILINOGEN FLD QL: SIGNIFICANT CHANGE UP
WBC # BLD: 9.49 K/UL — SIGNIFICANT CHANGE UP (ref 3.8–10.5)
WBC # FLD AUTO: 9.49 K/UL — SIGNIFICANT CHANGE UP (ref 3.8–10.5)

## 2021-08-07 PROCEDURE — 74177 CT ABD & PELVIS W/CONTRAST: CPT | Mod: 26

## 2021-08-07 RX ORDER — METOCLOPRAMIDE HCL 10 MG
10 TABLET ORAL ONCE
Refills: 0 | Status: COMPLETED | OUTPATIENT
Start: 2021-08-07 | End: 2021-08-07

## 2021-08-07 RX ORDER — KETOROLAC TROMETHAMINE 30 MG/ML
30 SYRINGE (ML) INJECTION ONCE
Refills: 0 | Status: DISCONTINUED | OUTPATIENT
Start: 2021-08-07 | End: 2021-08-07

## 2021-08-07 RX ORDER — MORPHINE SULFATE 50 MG/1
4 CAPSULE, EXTENDED RELEASE ORAL ONCE
Refills: 0 | Status: DISCONTINUED | OUTPATIENT
Start: 2021-08-07 | End: 2021-08-07

## 2021-08-07 RX ORDER — SODIUM CHLORIDE 9 MG/ML
1000 INJECTION INTRAMUSCULAR; INTRAVENOUS; SUBCUTANEOUS ONCE
Refills: 0 | Status: COMPLETED | OUTPATIENT
Start: 2021-08-07 | End: 2021-08-07

## 2021-08-07 RX ADMIN — SODIUM CHLORIDE 1000 MILLILITER(S): 9 INJECTION INTRAMUSCULAR; INTRAVENOUS; SUBCUTANEOUS at 00:58

## 2021-08-07 RX ADMIN — Medication 30 MILLIGRAM(S): at 06:32

## 2021-08-07 RX ADMIN — Medication 1 TABLET(S): at 05:32

## 2021-08-07 RX ADMIN — Medication 10 MILLIGRAM(S): at 00:57

## 2021-08-07 NOTE — ED PROVIDER NOTE - PROGRESS NOTE DETAILS
CT with diverticulitis. Results discussed with patient, agrees to follow up with GI in 1 month. Pain improved.

## 2021-08-07 NOTE — ED ADULT NURSE NOTE - OBJECTIVE STATEMENT
Patient A&Ox4 ambulatory c/o intermittent RLQ and umbilical area pain x1 month. Patient states hx of hernia. States she came to ED a month ago where she was diagnosed with diverticulitis, prescribed antibiotics, patient unable to complete due to stomach upset from medication. LBM this morning. Bowel sounds present in all quadrants. Tenderness noted to RLQ and umbilical area. Respirations even and unlabored. 20 gauge IV placed to right antecubital, labs drawn and sent. Stretcher in lowest position, wheels locked, appropriate side rails in place.

## 2021-08-07 NOTE — ED PROVIDER NOTE - ATTENDING CONTRIBUTION TO CARE
Seen and examined, pt. c/o inc. abd pain after recent dx of diverticulitis, rx for cipro/flagyl but felt sick after flagyl, completed cipro only. Also has hx of gastric sleeve surgery in distant past. No fever/chills, +vomiting episodes and 1 loose stool, denies blood/melena. Clear lungs, heart reg, abd soft, tender LLQ and suprapubic, no praful/guarding, no CVAT, no edema, NT calves.

## 2021-08-07 NOTE — ED PROVIDER NOTE - NS ED ROS FT
GENERAL: No fever or chills  EYES: no change in vision  HEENT: no trouble swallowing or speaking  CARDIAC: see hpi  PULMONARY: no cough or SOB  GI: see hpi  : No changes in urination  SKIN: no rashes  NEURO:  headache. No numbness, or weakness.  MSK: No joint pain

## 2021-08-07 NOTE — ED PROVIDER NOTE - PHYSICAL EXAMINATION
Gen: AAOx3, non-toxic  Head: NCAT  HEENT: EOMI, oral mucosa moist, normal conjunctiva  Lung: CTAB, no respiratory distress, no wheezes/rhonchi/rales B/L, speaking in full sentences  CV: RRR, no murmurs, rubs or gallops. No leg edema. Symmetrical pulses.   Abd: LLQ and epigastric ttp, no guarding, no CVA tenderness  MSK: no visible deformities  Neuro: No focal sensory or motor deficits, normal CN exam   Skin: Warm, well perfused, no rash  Psych: normal affect.

## 2021-08-07 NOTE — ED ADULT NURSE REASSESSMENT NOTE - NS ED NURSE REASSESS COMMENT FT1
Break RN: Pt is AOX4, repositioned on stretcher. Given PO contrast for CT. Awaiting CT at this time. Will continue to monitor

## 2021-08-07 NOTE — ED PROVIDER NOTE - PATIENT PORTAL LINK FT
You can access the FollowMyHealth Patient Portal offered by [facility] by registering at the following website: [email or web address]. By joining [employer]’s FollowMyHealth portal, you will also be able to view your health information using other applications (apps) compatible with our system.

## 2021-08-07 NOTE — ED PROVIDER NOTE - OBJECTIVE STATEMENT
49 yo F with hx of gastric sleeve , cholecystectomy, abdominal wall hernia repair, , diverticulitis in 2021, presents with llq pain worsening for the last 3 days. Reports a sharp llq pain, similar but worse than previous diverticulitis episode, also with 2-3 episodes of vomiting daily. Last BM today, passing gas. 51 yo F with hx of gastric sleeve , cholecystectomy, abdominal wall hernia repair, , diverticulitis in 2021, presents with llq pain worsening for the last 3 days. Reports a sharp llq pain, similar but worse than previous diverticulitis episode, also with 2-3 episodes of vomiting daily. Last BM today, passing gas. No fevers or chills. Reports dysuria, no flank pain. Also endorses diffuse body aches described as cramping for several weeks, as well as an intermittent blt throbbing headache similar to prior migraine headaches gradual in onset, not worse headache of her life. No focal weakness. Also reports intermittent substernal chest pressure, not exertional. Also with lightheadedness.

## 2021-08-07 NOTE — ED PROVIDER NOTE - NSFOLLOWUPCLINICS_GEN_ALL_ED_FT
Gastroenterology at [facility]  Gastroenterology  [address] [place] [id number]  Phone: [phone]  Fax:

## 2021-08-07 NOTE — ED PROVIDER NOTE - NSFOLLOWUPINSTRUCTIONS_ED_ALL_ED_FT
You were seen in the ED for abd pain; diverticulitis.   The following labs/imaging were obtained: see attached (if applicable)  Continue home medications (if any).   Take Acetaminophen (Tylenol 1,000mg each 6-8hrs) AND /OR Ibuprofen (Motrin 600mg each 6-8hrs) for pain. Take Ibuprofen with meals.  Take Augmentin as prescribed.   Return to the ED if you develop fever, inability to tolerate oral intake,  worsening or new concerning symptoms.  Follow up with your primary care in 2-3 days. Follow up with GI in1 month for colonoscopy.   Discussed with pt results of work up, strict return precautions, and need for follow up.  Pt expressed understanding and agrees with plan.

## 2021-08-07 NOTE — ED PROVIDER NOTE - CLINICAL SUMMARY MEDICAL DECISION MAKING FREE TEXT BOX
51 yo F with hx of gastric sleeve , cholecystectomy, abdominal wall hernia repair, , diverticulitis in 2021, presents with llq pain worsening for the last 3 days, also with headache, chest pain and muscle cramping. Abdominal pain concerning for recurrent diverticulitis vs less likely intraabdominal abscess, vs complication from bariatric surgery. Cramping could be secondary to electrolyte imbalance. Headache not consistent with SAH. Labs, Ct abd with po and IV, UA, UC, chest XR, trop, symptomatic treatment and reassess.

## 2021-08-09 LAB
CULTURE RESULTS: SIGNIFICANT CHANGE UP
SPECIMEN SOURCE: SIGNIFICANT CHANGE UP

## 2021-09-09 NOTE — ED PROVIDER NOTE - INTERPRETATION
9/9/2021         RE: Chago Soni  [address]  [facility] [address]        Dear Colleague,    Thank you for referring your patient, Chago Soni, to the [facility]. Please see a copy of my visit note below.    Chago Soni was seen in the Allergy Clinic at [facility].      Chago Soni is a 27 year old Choose not to answer male who is seen today for an oral food challenge to egg. He has been feeling well and has not any recent fevers or illness. Ross was counseled regarding the risks and benefits of today's procedure and gave verbal and written consent to proceed.    History reviewed. No pertinent past medical history.  Family History   Problem Relation Age of Onset     Allergies Mother      Allergies Father      Social History     Tobacco Use     Smoking status: Never Smoker     Smokeless tobacco: Never Used   Substance Use Topics     Alcohol use: Yes     Drug use: Never     Social History     Social History Narrative     Not on file       Past medical, family, and social history were reviewed.    REVIEW OF SYSTEMS:  General: negative for weight gain. negative for weight loss. negative for changes in sleep.   Eyes: negative for itching. negative for redness. negative for tearing/watering. negative for vision changes  Ears: negative for fullness. negative for hearing loss. negative for dizziness.   Nose: negative for snoring.negative for changes in smell. negative for drainage.   Throat: negative for hoarseness. negative for sore throat. negative for trouble swallowing.   Lungs: negative for cough. negative for shortness of breath.negative for wheezing. negative for sputum production.   Cardiovascular: negative for chest pain. negative for swelling of ankles. negative for fast or irregular heartbeat.   Gastrointestinal: negative for nausea. negative for heartburn. negative for acid reflux.   Musculoskeletal: negative for joint  pain. negative for joint stiffness. negative for joint swelling.   Neurologic: negative for seizures. negative for fainting. negative for weakness.   Psychiatric: negative for changes in mood. negative for anxiety.   Endocrine: negative for cold intolerance. negative for heat intolerance. negative for tremors.   Hematologic: negative for easy bruising. negative for easy bleeding.  Integumentary: negative for rash. negative for scaling. negative for nail changes.       Current Outpatient Medications:      EPINEPHrine (AUVI-Q) 0.3 MG/0.3ML injection 2-pack, Inject 0.3 mLs (0.3 mg) into the muscle as needed for anaphylaxis, Disp: 2 each, Rfl: 2     erythromycin (ROMYCIN) 5 MG/GM ophthalmic ointment, as needed, Disp: , Rfl:      famotidine (PEPCID) 20 MG tablet, Take 1 tablet (20 mg) by mouth 2 times daily, Disp: 60 tablet, Rfl: 1     fluticasone (FLONASE) 50 MCG/ACT nasal spray, Spray 1 spray into both nostrils daily, Disp: 16 g, Rfl: 1     metroNIDAZOLE (METROGEL) 0.75 % external gel, Apply topically 2 times daily, Disp: 45 g, Rfl: 1     mineral oil-hydrophilic petrolatum (AQUAPHOR) external ointment, Apply topically as needed, Disp: , Rfl:      mometasone (ELOCON) 0.1 % external cream, Apply topically daily To hands, Disp: 45 g, Rfl: 1     montelukast (SINGULAIR) 10 MG tablet, Take 1 tablet (10 mg) by mouth At Bedtime, Disp: 90 tablet, Rfl: 1     mupirocin (BACTROBAN) 2 % external cream, Apply topically 3 times daily To nose, Disp: 30 g, Rfl: 1     ORDER FOR ALLERGEN IMMUNOTHERAPY, Name of Mix: Mix #1  Mold, Cat Cat Hair, Standardized 10,000 BAU/mL, ALK  2.0 ml  Alternaria Tenuis 1:10 w/v, HS  0.5 ml Epicoccum Nigrum 1:10 w/v, HS 0.5 ml Phoma Herbarum 1:10 w/v, HS  0.5 ml Diluent: HSA qs to 5ml, Disp: 5 mL, Rfl: PRN     ORDER FOR ALLERGEN IMMUNOTHERAPY, Name of Mix: Mix #2  Dust Mite, Dog, Grass, Weeds Dog Hair-Dander, A. P.  1:100 w/v, HS  1.0 ml Dust Mites DP. 10,000 AU/mL, HS  1.0 ml  Speedy Grass (Std) 100,000  BAU/mL, HS 0.4 ml Pigweed, Careless 1:20 w/v, HS 0.5 ml Ragweed Mixed 1:20 w/v ALK  0.5 ml Sorrel, Sheep 1:20 w/v, HS 0.5 ml Diluent: HSA qs to 5ml, Disp: 5 mL, Rfl: PRN     ORDER FOR ALLERGEN IMMUNOTHERAPY, Name of Mix: Mix #3  Tree  Birch Mix PRW 1:20 w/v, HS  0.5 ml Boxelder-Maple Mix BHR (Boxelder Hard Red) 1:20 w/v, HS  0.5 ml Elm, American 1:20 w/v, HS  0.5 ml Hackberry 1:20 w/v, HS 0.5 ml Hickory, Shagbark 1:20 w/v, HS  0.5 ml [place] Mix RW 1:20 w/v, HS 0.5 ml Oak Mix RVW 1:20 w/v, HS 0.5 ml [place] Tree, Black 1:20 w/v, HS 0.5 ml [place], Black 1:20 w/v, HS 0.5 ml Diluent: HSA qs to 5ml, Disp: 5 mL, Rfl: PRN     tacrolimus (PROTOPIC) 0.1 % external ointment, Apply once daily to rash on the nose and eyelids, Disp: 30 g, Rfl: 11     cetirizine (ZYRTEC) 10 MG tablet, Take 10 mg by mouth daily (Patient not taking: Reported on 9/9/2021), Disp: , Rfl:      EPINEPHrine (ANY BX GENERIC EQUIV) 0.3 MG/0.3ML injection 2-pack, Inject 0.3 mLs (0.3 mg) into the muscle as needed for anaphylaxis (Patient not taking: Reported on 8/5/2021), Disp: 0.6 mL, Rfl: 2  Allergies   Allergen Reactions     Doxycycline      Eye swelling        EXAM:   /74 (BP Location: Right arm, Patient Position: Sitting, Cuff Size: Adult Regular)   Pulse 52   SpO2 98%   GENERAL APPEARANCE: alert, cooperative and not in distress  SKIN: no rashes, no lesions  HEAD: atraumatic, normocephalic  ENT: no scars or lesions, tongue midline and normal, soft palate, uvula, and tonsils normal  NECK: no asymmetry, masses, or scars, supple without significant adenopathy  LUNGS: unlabored respirations, no intercostal retractions or accessory muscle use, clear to auscultation without rales or wheezes  HEART: regular rate and rhythm without murmurs and normal S1 and S2  MUSCULOSKELETAL: no musculoskeletal defects are noted  NEURO: no focal deficits noted  PSYCH: does not appear depressed or anxious      WORKUP:  Food challenge    Open Egg Food Allergy  Challenge  Open Egg Food Allergy Challenge 9/9/2021   Start Time:  7:14 AM   Were the patient's pre-testing guidelines reviewed with the patient? Yes   Were the patient's pre-testing guidelines reviewed with the patient? Yes   Preparation of Sample: scrambled egg   Emergency equipment available? Yes   Skin Testing Results (Mm) 0   Antigen Specific IqE Results: 0.38   Increment 1 start time:  7:14 AM   Increment 1 route: Ingested   Dose: 1 g   Vitals Time:  7:30 AM   /78   Pulse: 52   SpO2 97%   Did the patient have a severe or unexpected reaction? No, continue test   Increment 2 start time:  7:30 AM   Increment 2 route: Ingested   Dose: 2 g   Vitals Time:  7:46 AM   /81   Pulse: 58   SpO2 99%   Did the patient have a severe or unexpected reaction? No, continue test   Increment 3 start time:  7:47 AM   Increment 3 route: Ingested   Dose: 5 g   Vitals Time:  8:03 AM   /78   Pulse: 47   SpO2 99%   Increment 4 start time:  8:04 AM   Increment 4 route: Ingested   /83   Pulse: 45   SpO2 100%   Did the patient have a severe or unexpected reaction? No, continue test   Open Egg Increment 5:  8:23 AM   Increment 5 route: Ingested   Dose: 20 g   Vitals Time:  8:42 AM   /83   Pulse: 48   SpO2 99%   Did the patient have a severe or unexpected reaction? No, continue test   Increment 6 start time:  8:43 AM   Increment 6 route: Ingested   Dose: 20 g   Vitals Time:  8:59 AM   /80   Pulse: 48   SpO2 99%   Did the patient have a severe or unexpected reaction? No, end test   End Time: 11:06 AM   Observation 1 Vitals Time:  9:31 AM   /80   Pulse: 47   SpO2: 98%   Reaction: none   Treatment: N/A   Observation 2 Vitals Time: 10:02 AM   /80   Pulse: 47   SpO2: 98%   Reaction: none    Treatment: N/A   Observation 3 Vitals Time: 10:34 AM   /78   Pulse: 46   SpO2: 98%   Reaction: none   Treatment: N/A   Observation 4 Vitals Time: 11:06 AM   /76   Pulse: 46   SpO2: 98%   Reaction:  none   Treatment: N/A   Interpretation: Pass        ASSESSMENT/PLAN:  Chago Soni is a 27 year old male here for an oral food challenge to scrambled egg.    1. Adverse reaction to food, subsequent encounter - Ross tolerated today's procedure without developing any signs or symptoms of an adverse reaction.    - may begin including egg in the diet without restrictionis  - LA INGESTION CHALLENGE TEST INITIAL 120 MINUTES  - LA INGESTION CHALLENGE TEST EACH ADDL 60 MINUTES    2. Egg allergy    - LA INGESTION CHALLENGE TEST INITIAL 120 MINUTES  - LA INGESTION CHALLENGE TEST EACH ADDL 60 MINUTES      Thank you for allowing me to participate in the care of Chago Soni.      Shavonne Torres MD, FAAAAI  Allergy/Immunology  [facility]      Chart documentation done in part with Dragon Voice Recognition Software. Although reviewed after completion, some word and grammatical errors may remain.    Patient evaluated by provider initially, prior to start of oral food challenge.  RN administered muffin per physician directed guidelines.  Patient was monitored for 15 minutes at each administered dose.  Once patient reached final dose, patient was monitored for 2 hours.  RN obtained 02 sat and pulse after each dose and reviewed any possible signs/symptoms of adverse reactions with patient.  If negative for any adverse reactions, RN then went to next dose interval.  Patient tolerated well.  All questions and concerns were addressed in clinic during oral food challenge.     Lucia GRAY RN          Again, thank you for allowing me to participate in the care of your patient.        Sincerely,        Shavonne Torres MD     normal sinus rhythm

## 2021-12-01 PROCEDURE — G9005: CPT

## 2022-03-16 ENCOUNTER — INPATIENT (INPATIENT)
Facility: HOSPITAL | Age: 52
LOS: 2 days | Discharge: HOME | DRG: 391 | End: 2022-03-19
Attending: INTERNAL MEDICINE | Admitting: HOSPITALIST
Payer: MEDICAID

## 2022-03-16 VITALS
OXYGEN SATURATION: 99 % | RESPIRATION RATE: 20 BRPM | HEIGHT: 64 IN | DIASTOLIC BLOOD PRESSURE: 75 MMHG | HEART RATE: 111 BPM | WEIGHT: 175.93 LBS | TEMPERATURE: 98 F | SYSTOLIC BLOOD PRESSURE: 162 MMHG

## 2022-03-16 DIAGNOSIS — Z90.49 ACQUIRED ABSENCE OF OTHER SPECIFIED PARTS OF DIGESTIVE TRACT: Chronic | ICD-10-CM

## 2022-03-16 DIAGNOSIS — Z98.84 BARIATRIC SURGERY STATUS: Chronic | ICD-10-CM

## 2022-03-16 DIAGNOSIS — R10.9 UNSPECIFIED ABDOMINAL PAIN: ICD-10-CM

## 2022-03-16 DIAGNOSIS — H53.8 OTHER VISUAL DISTURBANCES: ICD-10-CM

## 2022-03-16 DIAGNOSIS — F41.9 ANXIETY DISORDER, UNSPECIFIED: ICD-10-CM

## 2022-03-16 DIAGNOSIS — R10.13 EPIGASTRIC PAIN: ICD-10-CM

## 2022-03-16 DIAGNOSIS — I10 ESSENTIAL (PRIMARY) HYPERTENSION: ICD-10-CM

## 2022-03-16 DIAGNOSIS — E05.90 THYROTOXICOSIS, UNSPECIFIED WITHOUT THYROTOXIC CRISIS OR STORM: ICD-10-CM

## 2022-03-16 LAB
ALBUMIN SERPL ELPH-MCNC: 3.3 G/DL — SIGNIFICANT CHANGE UP (ref 3.3–5)
ALBUMIN SERPL ELPH-MCNC: 4 G/DL — SIGNIFICANT CHANGE UP (ref 3.3–5)
ALP SERPL-CCNC: 245 U/L — HIGH (ref 40–120)
ALP SERPL-CCNC: 289 U/L — HIGH (ref 40–120)
ALT FLD-CCNC: 367 U/L — HIGH (ref 10–45)
ALT FLD-CCNC: 68 U/L — HIGH (ref 10–45)
ANION GAP SERPL CALC-SCNC: 10 MMOL/L — SIGNIFICANT CHANGE UP (ref 5–17)
ANION GAP SERPL CALC-SCNC: 11 MMOL/L — SIGNIFICANT CHANGE UP (ref 5–17)
APPEARANCE UR: CLEAR — SIGNIFICANT CHANGE UP
APTT BLD: 29.3 SEC — SIGNIFICANT CHANGE UP (ref 27.5–35.5)
AST SERPL-CCNC: 189 U/L — HIGH (ref 10–40)
AST SERPL-CCNC: 796 U/L — HIGH (ref 10–40)
BASE EXCESS BLDV CALC-SCNC: 1.5 MMOL/L — SIGNIFICANT CHANGE UP (ref -2–2)
BASOPHILS # BLD AUTO: 0.02 K/UL — SIGNIFICANT CHANGE UP (ref 0–0.2)
BASOPHILS # BLD AUTO: 0.03 K/UL — SIGNIFICANT CHANGE UP (ref 0–0.2)
BASOPHILS NFR BLD AUTO: 0.2 % — SIGNIFICANT CHANGE UP (ref 0–2)
BASOPHILS NFR BLD AUTO: 0.3 % — SIGNIFICANT CHANGE UP (ref 0–2)
BILIRUB SERPL-MCNC: 0.2 MG/DL — SIGNIFICANT CHANGE UP (ref 0.2–1.2)
BILIRUB SERPL-MCNC: 0.7 MG/DL — SIGNIFICANT CHANGE UP (ref 0.2–1.2)
BILIRUB UR-MCNC: NEGATIVE — SIGNIFICANT CHANGE UP
BUN SERPL-MCNC: 10 MG/DL — SIGNIFICANT CHANGE UP (ref 7–23)
BUN SERPL-MCNC: 8 MG/DL — SIGNIFICANT CHANGE UP (ref 7–23)
CALCIUM SERPL-MCNC: 8.8 MG/DL — SIGNIFICANT CHANGE UP (ref 8.4–10.5)
CALCIUM SERPL-MCNC: 9.7 MG/DL — SIGNIFICANT CHANGE UP (ref 8.4–10.5)
CHLORIDE SERPL-SCNC: 104 MMOL/L — SIGNIFICANT CHANGE UP (ref 96–108)
CHLORIDE SERPL-SCNC: 105 MMOL/L — SIGNIFICANT CHANGE UP (ref 96–108)
CO2 BLDV-SCNC: 30 MMOL/L — HIGH (ref 22–26)
CO2 SERPL-SCNC: 22 MMOL/L — SIGNIFICANT CHANGE UP (ref 22–31)
CO2 SERPL-SCNC: 27 MMOL/L — SIGNIFICANT CHANGE UP (ref 22–31)
COLOR SPEC: SIGNIFICANT CHANGE UP
CREAT SERPL-MCNC: 0.6 MG/DL — SIGNIFICANT CHANGE UP (ref 0.5–1.3)
CREAT SERPL-MCNC: 0.67 MG/DL — SIGNIFICANT CHANGE UP (ref 0.5–1.3)
DIFF PNL FLD: NEGATIVE — SIGNIFICANT CHANGE UP
EGFR: 106 ML/MIN/1.73M2 — SIGNIFICANT CHANGE UP
EGFR: 109 ML/MIN/1.73M2 — SIGNIFICANT CHANGE UP
EOSINOPHIL # BLD AUTO: 0.12 K/UL — SIGNIFICANT CHANGE UP (ref 0–0.5)
EOSINOPHIL # BLD AUTO: 0.19 K/UL — SIGNIFICANT CHANGE UP (ref 0–0.5)
EOSINOPHIL NFR BLD AUTO: 1.4 % — SIGNIFICANT CHANGE UP (ref 0–6)
EOSINOPHIL NFR BLD AUTO: 1.7 % — SIGNIFICANT CHANGE UP (ref 0–6)
GAS PNL BLDV: SIGNIFICANT CHANGE UP
GLUCOSE SERPL-MCNC: 104 MG/DL — HIGH (ref 70–99)
GLUCOSE SERPL-MCNC: 97 MG/DL — SIGNIFICANT CHANGE UP (ref 70–99)
GLUCOSE UR QL: NEGATIVE — SIGNIFICANT CHANGE UP
HCG SERPL-ACNC: <2 MIU/ML — SIGNIFICANT CHANGE UP
HCO3 BLDV-SCNC: 28 MMOL/L — SIGNIFICANT CHANGE UP (ref 22–29)
HCT VFR BLD CALC: 30.9 % — LOW (ref 34.5–45)
HCT VFR BLD CALC: 34.5 % — SIGNIFICANT CHANGE UP (ref 34.5–45)
HGB BLD-MCNC: 10.4 G/DL — LOW (ref 11.5–15.5)
HGB BLD-MCNC: 9.5 G/DL — LOW (ref 11.5–15.5)
IMM GRANULOCYTES NFR BLD AUTO: 0.2 % — SIGNIFICANT CHANGE UP (ref 0–1.5)
IMM GRANULOCYTES NFR BLD AUTO: 0.3 % — SIGNIFICANT CHANGE UP (ref 0–1.5)
INR BLD: 1.14 RATIO — SIGNIFICANT CHANGE UP (ref 0.88–1.16)
KETONES UR-MCNC: NEGATIVE — SIGNIFICANT CHANGE UP
LEUKOCYTE ESTERASE UR-ACNC: NEGATIVE — SIGNIFICANT CHANGE UP
LIDOCAIN IGE QN: 27 U/L — SIGNIFICANT CHANGE UP (ref 7–60)
LYMPHOCYTES # BLD AUTO: 1.44 K/UL — SIGNIFICANT CHANGE UP (ref 1–3.3)
LYMPHOCYTES # BLD AUTO: 20.8 % — SIGNIFICANT CHANGE UP (ref 13–44)
LYMPHOCYTES # BLD AUTO: 22.6 % — SIGNIFICANT CHANGE UP (ref 13–44)
LYMPHOCYTES # BLD AUTO: 3.02 K/UL — SIGNIFICANT CHANGE UP (ref 1–3.3)
MCHC RBC-ENTMCNC: 23.2 PG — LOW (ref 27–34)
MCHC RBC-ENTMCNC: 23.7 PG — LOW (ref 27–34)
MCHC RBC-ENTMCNC: 30.1 GM/DL — LOW (ref 32–36)
MCHC RBC-ENTMCNC: 30.7 GM/DL — LOW (ref 32–36)
MCV RBC AUTO: 75.6 FL — LOW (ref 80–100)
MCV RBC AUTO: 78.6 FL — LOW (ref 80–100)
MONOCYTES # BLD AUTO: 0.89 K/UL — SIGNIFICANT CHANGE UP (ref 0–0.9)
MONOCYTES # BLD AUTO: 1.11 K/UL — HIGH (ref 0–0.9)
MONOCYTES NFR BLD AUTO: 12.9 % — SIGNIFICANT CHANGE UP (ref 2–14)
MONOCYTES NFR BLD AUTO: 8.3 % — SIGNIFICANT CHANGE UP (ref 2–14)
NEUTROPHILS # BLD AUTO: 4.43 K/UL — SIGNIFICANT CHANGE UP (ref 1.8–7.4)
NEUTROPHILS # BLD AUTO: 9.01 K/UL — HIGH (ref 1.8–7.4)
NEUTROPHILS NFR BLD AUTO: 64 % — SIGNIFICANT CHANGE UP (ref 43–77)
NEUTROPHILS NFR BLD AUTO: 67.3 % — SIGNIFICANT CHANGE UP (ref 43–77)
NITRITE UR-MCNC: NEGATIVE — SIGNIFICANT CHANGE UP
NRBC # BLD: 0 /100 WBCS — SIGNIFICANT CHANGE UP (ref 0–0)
NRBC # BLD: 0 /100 WBCS — SIGNIFICANT CHANGE UP (ref 0–0)
PCO2 BLDV: 54 MMHG — HIGH (ref 39–42)
PH BLDV: 7.33 — SIGNIFICANT CHANGE UP (ref 7.32–7.43)
PH UR: 6 — SIGNIFICANT CHANGE UP (ref 5–8)
PLATELET # BLD AUTO: 286 K/UL — SIGNIFICANT CHANGE UP (ref 150–400)
PLATELET # BLD AUTO: 337 K/UL — SIGNIFICANT CHANGE UP (ref 150–400)
PO2 BLDV: 23 MMHG — LOW (ref 25–45)
POTASSIUM SERPL-MCNC: 3.8 MMOL/L — SIGNIFICANT CHANGE UP (ref 3.5–5.3)
POTASSIUM SERPL-MCNC: 4 MMOL/L — SIGNIFICANT CHANGE UP (ref 3.5–5.3)
POTASSIUM SERPL-SCNC: 3.8 MMOL/L — SIGNIFICANT CHANGE UP (ref 3.5–5.3)
POTASSIUM SERPL-SCNC: 4 MMOL/L — SIGNIFICANT CHANGE UP (ref 3.5–5.3)
PROT SERPL-MCNC: 6.1 G/DL — SIGNIFICANT CHANGE UP (ref 6–8.3)
PROT SERPL-MCNC: 7 G/DL — SIGNIFICANT CHANGE UP (ref 6–8.3)
PROT UR-MCNC: NEGATIVE — SIGNIFICANT CHANGE UP
PROTHROM AB SERPL-ACNC: 13.1 SEC — SIGNIFICANT CHANGE UP (ref 10.5–13.4)
RBC # BLD: 4.09 M/UL — SIGNIFICANT CHANGE UP (ref 3.8–5.2)
RBC # BLD: 4.39 M/UL — SIGNIFICANT CHANGE UP (ref 3.8–5.2)
RBC # FLD: 14.6 % — HIGH (ref 10.3–14.5)
RBC # FLD: 14.8 % — HIGH (ref 10.3–14.5)
SAO2 % BLDV: 33.2 % — LOW (ref 67–88)
SARS-COV-2 RNA SPEC QL NAA+PROBE: SIGNIFICANT CHANGE UP
SODIUM SERPL-SCNC: 136 MMOL/L — SIGNIFICANT CHANGE UP (ref 135–145)
SODIUM SERPL-SCNC: 143 MMOL/L — SIGNIFICANT CHANGE UP (ref 135–145)
SP GR SPEC: 1.01 — LOW (ref 1.01–1.02)
TSH SERPL-MCNC: <0.01 UIU/ML — LOW (ref 0.27–4.2)
UROBILINOGEN FLD QL: NEGATIVE — SIGNIFICANT CHANGE UP
WBC # BLD: 13.39 K/UL — HIGH (ref 3.8–10.5)
WBC # BLD: 6.92 K/UL — SIGNIFICANT CHANGE UP (ref 3.8–10.5)
WBC # FLD AUTO: 13.39 K/UL — HIGH (ref 3.8–10.5)
WBC # FLD AUTO: 6.92 K/UL — SIGNIFICANT CHANGE UP (ref 3.8–10.5)

## 2022-03-16 PROCEDURE — 74177 CT ABD & PELVIS W/CONTRAST: CPT | Mod: 26,MA

## 2022-03-16 PROCEDURE — 99223 1ST HOSP IP/OBS HIGH 75: CPT

## 2022-03-16 PROCEDURE — 99223 1ST HOSP IP/OBS HIGH 75: CPT | Mod: GC

## 2022-03-16 PROCEDURE — 99221 1ST HOSP IP/OBS SF/LOW 40: CPT

## 2022-03-16 PROCEDURE — 99285 EMERGENCY DEPT VISIT HI MDM: CPT

## 2022-03-16 RX ORDER — ERGOCALCIFEROL 1.25 MG/1
50000 CAPSULE ORAL
Refills: 0 | Status: DISCONTINUED | OUTPATIENT
Start: 2022-03-16 | End: 2022-03-19

## 2022-03-16 RX ORDER — FAMOTIDINE 10 MG/ML
40 INJECTION INTRAVENOUS AT BEDTIME
Refills: 0 | Status: DISCONTINUED | OUTPATIENT
Start: 2022-03-16 | End: 2022-03-19

## 2022-03-16 RX ORDER — BENZOYL PEROXIDE MICRONIZED 5.8 %
1 TOWELETTE (EA) TOPICAL
Qty: 0 | Refills: 0 | DISCHARGE

## 2022-03-16 RX ORDER — ONDANSETRON 8 MG/1
4 TABLET, FILM COATED ORAL EVERY 8 HOURS
Refills: 0 | Status: DISCONTINUED | OUTPATIENT
Start: 2022-03-16 | End: 2022-03-19

## 2022-03-16 RX ORDER — ERGOCALCIFEROL 1.25 MG/1
1 CAPSULE ORAL
Qty: 0 | Refills: 0 | DISCHARGE

## 2022-03-16 RX ORDER — FAMOTIDINE 10 MG/ML
1 INJECTION INTRAVENOUS
Qty: 0 | Refills: 0 | DISCHARGE

## 2022-03-16 RX ORDER — KETOROLAC TROMETHAMINE 30 MG/ML
15 SYRINGE (ML) INJECTION ONCE
Refills: 0 | Status: DISCONTINUED | OUTPATIENT
Start: 2022-03-16 | End: 2022-03-16

## 2022-03-16 RX ORDER — ACETAMINOPHEN 500 MG
650 TABLET ORAL EVERY 6 HOURS
Refills: 0 | Status: DISCONTINUED | OUTPATIENT
Start: 2022-03-16 | End: 2022-03-16

## 2022-03-16 RX ORDER — LISINOPRIL 2.5 MG/1
1 TABLET ORAL
Qty: 0 | Refills: 0 | DISCHARGE

## 2022-03-16 RX ORDER — METHIMAZOLE 10 MG/1
1 TABLET ORAL
Qty: 0 | Refills: 0 | DISCHARGE

## 2022-03-16 RX ADMIN — FAMOTIDINE 40 MILLIGRAM(S): 10 INJECTION INTRAVENOUS at 22:32

## 2022-03-16 RX ADMIN — Medication 0.5 MILLIGRAM(S): at 03:34

## 2022-03-16 RX ADMIN — Medication 15 MILLIGRAM(S): at 03:28

## 2022-03-16 NOTE — H&P ADULT - NSHPREVIEWOFSYSTEMS_GEN_ALL_CORE
Review of Systems:   CONSTITUTIONAL: No fever  EYES: No eye pain, visual disturbances  ENMT:  No difficulty hearing,   NECK: No pain or stiffness  RESPIRATORY: No cough, No shortness of breath  CARDIOVASCULAR: No chest pain, palpitations,  GASTROINTESTINAL: No abdominal or epigastric pain. No nausea, vomiting,  GENITOURINARY: No dysuria,   NEUROLOGICAL: No headaches,   SKIN: No rashes  ENDOCRINE: No heat or cold intolerance  MUSCULOSKELETAL: No joint pain or swelling;   PSYCHIATRIC: No depression,   ALLERY AND IMMUNOLOGIC: No hives or eczema Review of Systems:   CONSTITUTIONAL: No fever or chills   EYES: No eye pain, b/l blurry vision  ENMT:  No difficulty hearing,   NECK: No pain or stiffness  RESPIRATORY: No cough, No shortness of breath  CARDIOVASCULAR: No chest pain, palpitations,  GASTROINTESTINAL: + epigastric abd pain. No nausea, vomiting,  GENITOURINARY: No dysuria,   NEUROLOGICAL: No headaches,   SKIN: No rashes  ENDOCRINE: No heat or cold intolerance  MUSCULOSKELETAL: +right sided neck pain and left hand pain - no swelling  PSYCHIATRIC: No depression,   ALLERY AND IMMUNOLOGIC: No hives or eczema

## 2022-03-16 NOTE — ED PROVIDER NOTE - NS ED ROS FT
REVIEW OF SYSTEMS:    CONSTITUTIONAL: No weakness, fevers or chills  EYES/ENT: No visual changes;  No vertigo or throat pain   NECK: No pain or stiffness  RESPIRATORY: No cough, wheezing, hemoptysis; No shortness of breath  GENITOURINARY: No dysuria, frequency or hematuria  NEUROLOGICAL: No numbness or weakness  All other review of systems is negative unless indicated above.

## 2022-03-16 NOTE — ED ADULT NURSE REASSESSMENT NOTE - NS ED NURSE REASSESS COMMENT FT1
Report received from Rajni WHEELER in [facility]. Patient seen resting comfortably in bed, awaiting admission. Covid swab sent, safety and comfort provided.

## 2022-03-16 NOTE — CONSULT NOTE ADULT - SUBJECTIVE AND OBJECTIVE BOX
GENERAL SURGERY CONSULT NOTE  Consulting surgical team: Bariatric Surgery/Green Team   Consulting attending: Dr. Medina     HPI:  51F with PMHx anemia, anxiety, HTN and PSHx laparoscopic sleeve gastrectomy and cholecystectomy ( with Dr. Roscoe Boggs),  presenting with 1 day of epigastric pain. Patient was in her normal state of health when she developed epigastric pain that radiates diffusely yesterday which became progressively worse, describes as vague and moderate, intermittent. No alleviating or exacerbating factors. Tolerating PO intake, denies dysphagia, early satiety, nausea/vomiting. Having full GI function with last BM yesterday evening which was normal. Denies fever/chills, SOB/CP, reflux/GERD, weakness. Last EGD around time of sleeve gastrectomy, which was normal, has not seen her Bariatric Surgeon in "years", lost ~100 lbs.     PAST MEDICAL HISTORY:  Migraines  Anemia  Anxiety  Migraines  Hypertension    PAST SURGICAL HISTORY:  Gastric banding status  S/P cholecystectomy    MEDICATIONS:  Lisinopril 10 QD  Methimazole 5 TID  Xanax PRN     ALLERGIES:  morphine (Short breath)  morphine (Stomach Upset)  oxycodone (Rash)      VITALS & I/Os:  Vital Signs Last 24 Hrs  T(C): 36.7 (16 Mar 2022 07:14), Max: 36.7 (16 Mar 2022 04:52)  T(F): 98 (16 Mar 2022 07:14), Max: 98 (16 Mar 2022 04:52)  HR: 80 (16 Mar 2022 07:14) (80 - 111)  BP: 137/76 (16 Mar 2022 07:14) (134/87 - 162/75)  BP(mean): --  RR: 13 (16 Mar 2022 07:14) (13 - 20)  SpO2: 100% (16 Mar 2022 07:14) (98% - 100%)    I&O's Summary      PHYSICAL EXAM:  GEN: resting comfortably in bed, in NAD  ABD: soft, non-distended, mild tenderness to epigastrium and b/l lower quadrants, no rebound tenderness/guarding  NEURO: AAOx4, no focal neuro deficits; CN II-IX intact    LABS:                        10.4   13.39 )-----------( 337      ( 16 Mar 2022 02:54 )             34.5     03-16    143  |  105  |  10  ----------------------------<  104<H>  3.8   |  27  |  0.67    Ca    9.7      16 Mar 2022 02:54    TPro  7.0  /  Alb  4.0  /  TBili  0.2  /  DBili  x   /  AST  189<H>  /  ALT  68<H>  /  AlkPhos  245<H>      Lactate:              Urinalysis Basic - ( 16 Mar 2022 03:55 )    Color: Light Yellow / Appearance: Clear / S.009 / pH: x  Gluc: x / Ketone: Negative  / Bili: Negative / Urobili: Negative   Blood: x / Protein: Negative / Nitrite: Negative   Leuk Esterase: Negative / RBC: x / WBC x   Sq Epi: x / Non Sq Epi: x / Bacteria: x        IMAGING:  < from: CT Abdomen and Pelvis w/ Oral Cont and w/ IV Cont (22 @ 05:32) >  FINDINGS:  LOWER CHEST: Within normal limits.    LIVER: Within normal limits.  BILE DUCTS: Normal caliber.  GALLBLADDER: Cholecystectomy.  SPLEEN: Within normal limits.  PANCREAS: Within normal limits.  ADRENALS: Within normal limits.  KIDNEYS/URETERS: Within normal limits.    BLADDER: Within normal limits.  REPRODUCTIVE ORGANS: Uterus and ovaries within normal limits.    BOWEL: Status post sleeve gastrectomy. Small hiatal hernia. No bowel   obstruction or inflammation. Colonic diverticulosis without   diverticulitis. Appendix is normal.  PERITONEUM: No ascites.  VESSELS: Within normal limits.  RETROPERITONEUM/LYMPH NODES: No lymphadenopathy.  ABDOMINAL WALL: Tiny fat-containing umbilical hernia.  BONES: Moderate degenerative disc disease at L5-S1.    IMPRESSION:  No bowel obstruction or inflammation.

## 2022-03-16 NOTE — CONSULT NOTE ADULT - ATTENDING COMMENTS
51yF with hx of sleeve gastrectomy and cholecystectomy, presenting with epigastric abdominal pain radiating to RUQ and LUQ.  No fevers.  No other GI symptoms -- tolerating PO and normal bowel fxn.  She denies dysphagia, although she says she did previously have dysphagia for years after her sleeve.      NAD  Abd soft, nondistended, TTP epigastrium, mildly TTP RUQ and LUQ, no guarding or rebound.    Normal WBC  ASt/ALT/Alk Phos are elevated    CT reviewed -- sleeve appears angulated, small hiatal hernia, otherwise no abnormalities.    Impression:  Epigastric pain 2/2 gastritis vs ulcer vs choledocholithiasis    Recommend GI consult for EGD, can consider EUS to eval for CBD stones (or get MRCP)    No acute surgical intervention at this time    Luiz Medina MD
elevated liver enzymes AST > ALT  check CT, TTE  CPK, troponin  unclear etiology at the moment  unlikely DILI from methimazole  70 pound unintentional weight loss is concerning for lymphoma or occult malignancy  suspect liver enzyme elevation is secondary

## 2022-03-16 NOTE — H&P ADULT - PROBLEM SELECTOR PLAN 5
given ativan in ED  ISTOP checked Ref#[id number] - nothing prescribed recently  will continue to monitor

## 2022-03-16 NOTE — ED PROVIDER NOTE - PROGRESS NOTE DETAILS
Attending Salvatore:  surgery consulted for hx of bariatric surgery Attending Salvatore:  pt admitted to medicine for abd pain, elevated lfts, alphos

## 2022-03-16 NOTE — ED PROVIDER NOTE - PHYSICAL EXAMINATION
PHYSICAL EXAM:  GENERAL: NAD, lying in bed comfortably, rigoring   HEAD:  Atraumatic, Normocephalic  EYES: EOMI, PERRLA, conjunctiva and sclera clear  ENT: Moist mucous membranes  NECK: Supple, No JVD  CHEST/LUNG: Clear to auscultation bilaterally; No rales, rhonchi, wheezing, or rubs. Unlabored respirations  HEART: Regular rate and rhythm; No murmurs, rubs, or gallops  ABDOMEN: hyperactive bowel sounds, epigastric tenderness, no rebound, no guarding.   EXTREMITIES:  2+ Peripheral Pulses, brisk capillary refill. No clubbing, cyanosis, or edema  NERVOUS SYSTEM:  Alert & Oriented X3, speech clear. No deficits   MSK: FROM all 4 extremities, full and equal strength  SKIN: No rashes or lesions

## 2022-03-16 NOTE — H&P ADULT - NSICDXFAMILYHX_GEN_ALL_CORE_FT
FAMILY HISTORY:  Father  Still living? Unknown  Family history of pancreatic cancer, Age at diagnosis: Age Unknown  FH: lung cancer, Age at diagnosis: Age Unknown    Mother  Still living? Unknown  FH: type 2 diabetes, Age at diagnosis: Age Unknown

## 2022-03-16 NOTE — ED ADULT NURSE NOTE - OBJECTIVE STATEMENT
50 y/o female arrives to the ER complaining of abdominal pain.  PMH of Diverticulitis, gastric sleeve (2009), anxiety, anemia, migraines.   Pt is A&Ox4, speaking coherently. Pt states developing  severe diffuse abdominal pain since last night around 1130 pm. reports taking one dose of prescribed Cipro for diverticulitis, and Motrin for pain at 730pm. Additionally reports feeling pain on the clavicular area going to the shoulder, and having headache and feeling dizzy.  Pt denies SOB, chest pain, N/V/D, urinary symptoms, fevers, chills. On assessment airway is patent, breathing spontaneously and unlabored. Skin is dry, warm and color appropriate to race.  Abdomen is soft, no distended, tender on palpation. Full ROM in all extremities.  Patient undressed and placed into gown, side rails up with bed locked and in lowest position for safety. call bell within reach. [place] provided. Comfort and safety provided. Educated not to ambulate without assistance. will continue to reassess.

## 2022-03-16 NOTE — H&P ADULT - PROBLEM SELECTOR PLAN 4
seen by opthalmology as outpatient - symptoms b/l and occurring since covid per patient   pt will need optho f/u as outpatient

## 2022-03-16 NOTE — ED ADULT NURSE REASSESSMENT NOTE - NS ED NURSE REASSESS COMMENT FT1
pt resting comfortably on stretcher, pt doesn't have any complaints at this moment. Awaiting for CT results.

## 2022-03-16 NOTE — CONSULT NOTE ADULT - SUBJECTIVE AND OBJECTIVE BOX
HEPATOLOGY INITIAL CONSULT  -------------------------------------  Lara Douglas MD  Internal Medicine, PGY3    HPI:  50yo F PMH of gastric sleeve 2009, hyperthyroidism, cholecystectomy (patient unsure if she had gallstones), anxiety, anemia p/w sudden onset epigastric abdominal pain that began last night. no radiation of pain. no associated nausea, vomiting, diarrhea. pain improved after getting toradol in ED. patient denies any fevers or chills. patient states she occasionally gets abdominal pain for which she takes cipro for 2-3 days and resolves (?being treated by pcp for diverticulitis) - however, this pain is different. She did take the cipro with the pain last night without improvement. patient states she had covid in 2020 and subsequently developed left hand pain and right neck/shoulder pain that has been worked up as outpatient without clear cause. patient also c/o b/l blurry vision that she feels might be slightly worsening which she has seen opthalmology as outpatient and given glasses that "are not helping".  (16 Mar 2022 11:29)    Review of Systems: Negative except as per HPI.    Outpatient GI Provider: Dr. Willi Valentino    PAST MEDICAL & SURGICAL HISTORY:  - Migraines  - Anemia  - Anxiety  - Migraines  - Hypertension  - Gastric banding 2009  - s/p cholecystectomy 2009    MEDICATIONS  (STANDING):  ergocalciferol 29210 Unit(s) Oral every week  famotidine    Tablet 40 milliGRAM(s) Oral at bedtime  methimazole 5 milliGRAM(s) Oral every 8 hours  multivitamin 1 Tablet(s) Oral daily    MEDICATIONS  (PRN):  ondansetron Injectable 4 milliGRAM(s) IV Push every 8 hours PRN Nausea and/or Vomiting    SOCIAL HISTORY:  - Denies EtOH use  - Denies tobacco use    FAMILY HISTORY:  FH: lung cancer (Father)  Family history of pancreatic cancer (Father)  FH: type 2 diabetes (Mother)    Vital Signs Last 24 Hrs  T(C): 36.7 (16 Mar 2022 10:39), Max: 36.7 (16 Mar 2022 04:52)  T(F): 98.1 (16 Mar 2022 10:39), Max: 98.1 (16 Mar 2022 10:39)  HR: 96 (16 Mar 2022 10:39) (80 - 111)  BP: 122/76 (16 Mar 2022 10:39) (122/76 - 162/75)  RR: 18 (16 Mar 2022 10:39) (13 - 20)  SpO2: 99% (16 Mar 2022 10:39) (98% - 100%)    PHYSICAL EXAM:  GENERAL: NAD, breathing normal  HEAD:  Atraumatic, Normocephalic  EYES: conjunctiva and sclera clear  NECK: supple, No JVD  CHEST/LUNG: CTA b/l  HEART: S1 S2 RRR  ABDOMEN: +BS Soft, +epigastric and RUQ abdominal pain. negative do's sign, no rebound or guarding   EXTREMITIES:  2+ DP Pulses, No c/c. no LE edema  NEUROLOGY: AAOx3, no facial droop, moving all extremities  SKIN: No rashes or lesions    LABS:                      9.5    6.92  )-----------( 286      ( 16 Mar 2022 09:36 )             30.9     136  |  104  |  8   ----------------------------<  97  4.0   |  22  |  0.60    Ca    8.8      16 Mar 2022 09:36    TPro  6.1  /  Alb  3.3  /  TBili  0.7  /  DBili  x   /  AST  796<H>  /  ALT  367<H>  /  AlkPhos  289<H>  03-16    PT/INR - ( 16 Mar 2022 09:36 )   PT: 13.1 sec;   INR: 1.14 ratio    PTT - ( 16 Mar 2022 09:36 )  PTT:29.3 sec    LIVER FUNCTIONS - ( 16 Mar 2022 09:36 )  Alb: 3.3 g/dL / Pro: 6.1 g/dL / ALK PHOS: 289 U/L / ALT: 367 U/L / AST: 796 U/L / GGT: x           IMAGING:  CT Abdomen and Pelvis w/ Oral Cont and w/ IV Cont (03.16.22 @ 05:32)  IMPRESSION:  - No bowel obstruction or inflammation.   HEPATOLOGY INITIAL CONSULT  -------------------------------------  Lara Douglas MD  Internal Medicine, PGY3    HPI:  50yo F w/ PMH of gastric sleeve and cholecystectomy in 2009, anxiety, anemia and recent (3 months ago) dx of hyperthyroidism on Methimazole p/w epigastric abd pain.    Pt last ate meal at around 3PM on day PTA. At about 11PM when she was taking her nighttime medication she developed a sudden 8/10 stabbing, and sharp epigastric pain that did not resolve despite Motrin use. Pain did not radiate anywhere and pt denies any associated N/V, diarrhea, hematochezia or melena. She has had no previous c-scopes and EGD many years ago after sleeve which she reports was normal.     Upon presentation to the ED pt was administered Toradol which alleviated the pain, currently rated as a 2/10.     Of note, pt endorsing an unintentional 70lb weight loss over the past 4 months w/ associated fatigue and night sweats. Otherwise denies any CP, SOB, palpitations.    Hepatology was consulted for elevated liver enzymes.    Review of Systems: Negative except as per HPI.    Outpatient GI Provider: Dr. Willi Valentino    PAST MEDICAL & SURGICAL HISTORY:  - Migraines  - Anemia  - Anxiety  - Migraines  - Hypertension  - Gastric banding 2009  - s/p cholecystectomy 2009    MEDICATIONS  (STANDING):  ergocalciferol 06038 Unit(s) Oral every week  famotidine    Tablet 40 milliGRAM(s) Oral at bedtime  methimazole 5 milliGRAM(s) Oral every 8 hours  multivitamin 1 Tablet(s) Oral daily    MEDICATIONS  (PRN):  ondansetron Injectable 4 milliGRAM(s) IV Push every 8 hours PRN Nausea and/or Vomiting    SOCIAL HISTORY:  - Denies EtOH use  - Denies tobacco use    FAMILY HISTORY:  FH: lung cancer (Father)  Family history of pancreatic cancer (Father)  FH: type 2 diabetes (Mother)    Vital Signs Last 24 Hrs  T(C): 36.7 (16 Mar 2022 10:39), Max: 36.7 (16 Mar 2022 04:52)  T(F): 98.1 (16 Mar 2022 10:39), Max: 98.1 (16 Mar 2022 10:39)  HR: 96 (16 Mar 2022 10:39) (80 - 111)  BP: 122/76 (16 Mar 2022 10:39) (122/76 - 162/75)  RR: 18 (16 Mar 2022 10:39) (13 - 20)  SpO2: 99% (16 Mar 2022 10:39) (98% - 100%)    PHYSICAL EXAM:  GENERAL: NAD, breathing normal  HEAD:  Atraumatic, Normocephalic  EYES: conjunctiva and sclera clear  NECK: supple, No JVD  CHEST/LUNG: CTA b/l  HEART: S1 S2 RRR  ABDOMEN: +BS Soft, +epigastric and RUQ abdominal pain. negative do's sign, no rebound or guarding   EXTREMITIES:  2+ DP Pulses, No c/c. no LE edema  NEUROLOGY: AAOx3, no facial droop, moving all extremities  SKIN: No rashes or lesions    LABS:                      9.5    6.92  )-----------( 286      ( 16 Mar 2022 09:36 )             30.9     136  |  104  |  8   ----------------------------<  97  4.0   |  22  |  0.60    Ca    8.8      16 Mar 2022 09:36    TPro  6.1  /  Alb  3.3  /  TBili  0.7  /  DBili  x   /  AST  796<H>  /  ALT  367<H>  /  AlkPhos  289<H>  03-16    PT/INR - ( 16 Mar 2022 09:36 )   PT: 13.1 sec;   INR: 1.14 ratio    PTT - ( 16 Mar 2022 09:36 )  PTT:29.3 sec    LIVER FUNCTIONS - ( 16 Mar 2022 09:36 )  Alb: 3.3 g/dL / Pro: 6.1 g/dL / ALK PHOS: 289 U/L / ALT: 367 U/L / AST: 796 U/L / GGT: x           IMAGING:  CT Abdomen and Pelvis w/ Oral Cont and w/ IV Cont (03.16.22 @ 05:32)  IMPRESSION:  - No bowel obstruction or inflammation.

## 2022-03-16 NOTE — H&P ADULT - HISTORY OF PRESENT ILLNESS
51F h/o gastric sleeve 2009, hyperthyroidism, cholecystectomy (patient unsure if she had gallstones), anxiety, anemia p/w sudden onset epigastric abdominal pain that began last night. no associated nausea, vomiting, diarrhea. pain improved  51F h/o gastric sleeve 2009, hyperthyroidism, cholecystectomy (patient unsure if she had gallstones), anxiety, anemia p/w sudden onset epigastric abdominal pain that began last night. no radiation of pain. no associated nausea, vomiting, diarrhea. pain improved after getting toradol in ED. patient denies any fevers or chills. patient states she occasionally gets abdominal pain for which she takes cipro for 2-3 days and resolves (?being treated by pcp for diverticulitis) - however, this pain is different. She did take the cipro with the pain last night without improvement. patient states she had covid in 2020 and subsequently developed left hand pain and right neck/shoulder pain that has been worked up as outpatient without clear cause. patient also c/o b/l blurry vision that she feels might be slightly worsening which she has seen opthalmology as outpatient and given glasses that "are not helping".

## 2022-03-16 NOTE — H&P ADULT - PROBLEM SELECTOR PLAN 3
patient states she takes lisinopril prn at home   will monitor bp and start lisinopril if bp elevated

## 2022-03-16 NOTE — H&P ADULT - NSHPPHYSICALEXAM_GEN_ALL_CORE
Vital Signs Last 24 Hrs  T(C): 36.7 (16 Mar 2022 10:39), Max: 36.7 (16 Mar 2022 04:52)  T(F): 98.1 (16 Mar 2022 10:39), Max: 98.1 (16 Mar 2022 10:39)  HR: 96 (16 Mar 2022 10:39) (80 - 111)  BP: 122/76 (16 Mar 2022 10:39) (122/76 - 162/75)  BP(mean): --  RR: 18 (16 Mar 2022 10:39) (13 - 20)  SpO2: 99% (16 Mar 2022 10:39) (98% - 100%)    PHYSICAL EXAM:  GENERAL: NAD, breathing normal  HEAD:  Atraumatic, Normocephalic  EYES: conjunctiva and sclera clear  NECK: supple, No JVD  CHEST/LUNG: CTA b/l  HEART: S1 S2 RRR  ABDOMEN: +BS Soft, NT/ND  EXTREMITIES:  2+ DP Pulses, No c/c/e  NEUROLOGY: AAOx3, no facial droop, no focal deficits   SKIN: No rashes or lesions Vital Signs Last 24 Hrs  T(C): 36.7 (16 Mar 2022 10:39), Max: 36.7 (16 Mar 2022 04:52)  T(F): 98.1 (16 Mar 2022 10:39), Max: 98.1 (16 Mar 2022 10:39)  HR: 96 (16 Mar 2022 10:39) (80 - 111)  BP: 122/76 (16 Mar 2022 10:39) (122/76 - 162/75)  BP(mean): --  RR: 18 (16 Mar 2022 10:39) (13 - 20)  SpO2: 99% (16 Mar 2022 10:39) (98% - 100%)    PHYSICAL EXAM:  GENERAL: NAD, breathing normal  HEAD:  Atraumatic, Normocephalic  EYES: conjunctiva and sclera clear  NECK: supple, No JVD  CHEST/LUNG: CTA b/l  HEART: S1 S2 RRR  ABDOMEN: +BS Soft, +epigastric and RUQ abdominal pain. negative do's sign, no rebound or guarding   EXTREMITIES:  2+ DP Pulses, No c/c. no LE edema  NEUROLOGY: AAOx3, no facial droop, moving all extremities  SKIN: No rashes or lesions

## 2022-03-16 NOTE — H&P ADULT - NSHPLABSRESULTS_GEN_ALL_CORE
[facility] Division of Hospital Medicine  LABS:                        9.5    6.92  )-----------( 286      ( 16 Mar 2022 09:36 )             30.9     03-    136  |  104  |  8   ----------------------------<  97  4.0   |  22  |  0.60    Ca    8.8      16 Mar 2022 09:36    TPro  6.1  /  Alb  3.3  /  TBili  0.7  /  DBili  x   /  AST  796<H>  /  ALT  367<H>  /  AlkPhos  289<H>  03    PT/INR - ( 16 Mar 2022 09:36 )   PT: 13.1 sec;   INR: 1.14 ratio         PTT - ( 16 Mar 2022 09:36 )  PTT:29.3 sec      Urinalysis Basic - ( 16 Mar 2022 03:55 )    Color: Light Yellow / Appearance: Clear / S.009 / pH: x  Gluc: x / Ketone: Negative  / Bili: Negative / Urobili: Negative   Blood: x / Protein: Negative / Nitrite: Negative   Leuk Esterase: Negative / RBC: x / WBC x   Sq Epi: x / Non Sq Epi: x / Bacteria: x        RADIOLOGY & ADDITIONAL TESTS:    Imaging Personally Reviewed:  Consultant(s) Notes Reviewed:    Care Discussed with Consultants/Other Providers: [facility] Division of Hospital Medicine  LABS:                        9.5    6.92  )-----------( 286      ( 16 Mar 2022 09:36 )             30.9         136  |  104  |  8   ----------------------------<  97  4.0   |  22  |  0.60    Ca    8.8      16 Mar 2022 09:36    TPro  6.1  /  Alb  3.3  /  TBili  0.7  /  DBili  x   /  AST  796<H>  /  ALT  367<H>  /  AlkPhos  289<H>      PT/INR - ( 16 Mar 2022 09:36 )   PT: 13.1 sec;   INR: 1.14 ratio         PTT - ( 16 Mar 2022 09:36 )  PTT:29.3 sec      Urinalysis Basic - ( 16 Mar 2022 03:55 )    Color: Light Yellow / Appearance: Clear / S.009 / pH: x  Gluc: x / Ketone: Negative  / Bili: Negative / Urobili: Negative   Blood: x / Protein: Negative / Nitrite: Negative   Leuk Esterase: Negative / RBC: x / WBC x   Sq Epi: x / Non Sq Epi: x / Bacteria: x        RADIOLOGY & ADDITIONAL TESTS:    Imaging Personally Reviewed: EKG tracing reviewed - NSR@87bpm, TWI v1-V2, aVL,   CT a/p reviewed - LIVER: Within normal limits.  BILE DUCTS: Normal caliber.  GALLBLADDER: Cholecystectomy.  SPLEEN: Within normal limits.  PANCREAS: Within normal limits.  ADRENALS: Within normal limits.  KIDNEYS/URETERS: Within normal limits.    Consultant(s) Notes Reviewed:    Care Discussed with Consultants/Other Providers:

## 2022-03-16 NOTE — ED PROVIDER NOTE - ATTENDING CONTRIBUTION TO CARE
MD Chase:  patient seen and evaluated personally.   I agree with the History & Physical,  Impression & Plan other than what was detailed in my note.  MD Chase  51 F pmhx gastric sleeve (2009), cholecystectomy, ?thyroid disorder p/w abdominal pain starting earlier today, in epigastric area goes down to abd, associated w/ nausea, no lower abd pain, no urinary symptoms, no vaginal symptoms, afebrile vitals stable,  non toxic well appearing, NC/AT,  conjunctiva non conjected, sclera anicteric, moist mucous membranes, neck supple, heart sounds, normal, no mrg, lungs cta b/l no wrr, abd soft non distended w/ pos epigastric tenderness, no visual deformities of extremities, axox3, , normal mood and affect, plan for po contrast, bariatric surg consult, cbc, cmp, meds, re eval.

## 2022-03-16 NOTE — ED ADULT TRIAGE NOTE - TEMPERATURE IN FAHRENHEIT (DEGREES F)
68yo colon cancer with metastasis pt admitted for hyponatremia pt admitted for hyponatremia pt admitted for hyponatremia 97.8 pt admitted for hyponatremia , has a history of cancer. pt is a DNR/DNI pt admitted for hyponatremia Hx: Tumor lysis syndrome, stage 4 colon CA pt admitted for hyponatremia, pt has hx of cancer

## 2022-03-16 NOTE — PATIENT PROFILE ADULT - FALL HARM RISK - UNIVERSAL INTERVENTIONS
Bed in lowest position, wheels locked, appropriate side rails in place/Call bell, personal items and telephone in reach/Instruct patient to call for assistance before getting out of bed or chair/Non-slip footwear when patient is out of bed/[place] to call system/Physically safe environment - no spills, clutter or unnecessary equipment/Purposeful Proactive Rounding/Room/bathroom lighting operational, light cord in reach

## 2022-03-16 NOTE — ED PROVIDER NOTE - CLINICAL SUMMARY MEDICAL DECISION MAKING FREE TEXT BOX
51 F pmhx gastric sleeve, cholecystectomy presenting with epigastric tenderness. DDx includes choledocolithiasis vs PUD vs other  - check cbc, cmp, lipase  - check ct a/p with IV contrast  - surgical evaluation for patient with abdominal pain and gastric sleeve   - toradol for pain control, ativan for anxiety/nausea; reassess

## 2022-03-16 NOTE — ED ADULT TRIAGE NOTE - CHIEF COMPLAINT QUOTE
Pt reports SOB and severe abdominal pain beginning x1 hour prior to ED arrival. Denies N/V. Hx bariatric surgery 2009, and recent diverticulitis.

## 2022-03-16 NOTE — H&P ADULT - PROBLEM SELECTOR PLAN 1
with transaminitis -now pain improved - ?passed stone   patient unable to complete MRCP - as she is claustrophobic   CT a/p - no stones or cbd dilitation seen   will check US abd w/ duplex for now   GI consult called (house)  monitor LFTs with transaminitis -now pain improved - ?passed stone   patient unable to complete MRCP - as she is claustrophobic   CT a/p - no stones or cbd dilitation seen   will check US abd w/ duplex for now   GI consult called (house)  monitor LFTs  check hepatitis panel

## 2022-03-16 NOTE — ED ADULT NURSE NOTE - NSICDXPASTSURGICALHX_GEN_ALL_CORE_FT
PAST SURGICAL HISTORY:  Gastric banding status     History of cholecystectomy     S/P cholecystectomy

## 2022-03-16 NOTE — ED PROVIDER NOTE - OBJECTIVE STATEMENT
51 F pmhx gastric sleeve (2009), cholecystectomy, ?thyroid disorder p/w abdominal pain starting today. Patient notes the pain is epigastric and radiates down her abdomen. He has no diarrhea, constipation, vomiting, fevers or chills. She is tolerating a diet without problem. She trialed motrin and cipro (which was prescribed by pcp for diverticulitis) but this did not help. She notes some mild nausea as well as pain that radiates to her shoulder. No chest pain or shortness of breath. When the pain got much worse she decided to come to be evaluated in the emergency department.

## 2022-03-17 LAB
A1C WITH ESTIMATED AVERAGE GLUCOSE RESULT: 5.6 % — SIGNIFICANT CHANGE UP (ref 4–5.6)
ALBUMIN SERPL ELPH-MCNC: 3.3 G/DL — SIGNIFICANT CHANGE UP (ref 3.3–5)
ALP SERPL-CCNC: 335 U/L — HIGH (ref 40–120)
ALT FLD-CCNC: 248 U/L — HIGH (ref 10–45)
ANION GAP SERPL CALC-SCNC: 9 MMOL/L — SIGNIFICANT CHANGE UP (ref 5–17)
AST SERPL-CCNC: 178 U/L — HIGH (ref 10–40)
BILIRUB SERPL-MCNC: 0.6 MG/DL — SIGNIFICANT CHANGE UP (ref 0.2–1.2)
BUN SERPL-MCNC: 8 MG/DL — SIGNIFICANT CHANGE UP (ref 7–23)
CALCIUM SERPL-MCNC: 9.5 MG/DL — SIGNIFICANT CHANGE UP (ref 8.4–10.5)
CHLORIDE SERPL-SCNC: 107 MMOL/L — SIGNIFICANT CHANGE UP (ref 96–108)
CK MB BLD-MCNC: 1.2 % — SIGNIFICANT CHANGE UP (ref 0–3.5)
CK MB CFR SERPL CALC: 1 NG/ML — SIGNIFICANT CHANGE UP (ref 0–3.8)
CK SERPL-CCNC: 84 U/L — SIGNIFICANT CHANGE UP (ref 25–170)
CO2 SERPL-SCNC: 25 MMOL/L — SIGNIFICANT CHANGE UP (ref 22–31)
CREAT SERPL-MCNC: 0.65 MG/DL — SIGNIFICANT CHANGE UP (ref 0.5–1.3)
EGFR: 107 ML/MIN/1.73M2 — SIGNIFICANT CHANGE UP
ESTIMATED AVERAGE GLUCOSE: 114 MG/DL — SIGNIFICANT CHANGE UP (ref 68–114)
GLUCOSE SERPL-MCNC: 87 MG/DL — SIGNIFICANT CHANGE UP (ref 70–99)
HAV IGM SER-ACNC: SIGNIFICANT CHANGE UP
HBV CORE IGM SER-ACNC: SIGNIFICANT CHANGE UP
HBV SURFACE AG SER-ACNC: SIGNIFICANT CHANGE UP
HCT VFR BLD CALC: 33.2 % — LOW (ref 34.5–45)
HCV AB S/CO SERPL IA: 0.15 S/CO — SIGNIFICANT CHANGE UP (ref 0–0.99)
HCV AB SERPL-IMP: SIGNIFICANT CHANGE UP
HGB BLD-MCNC: 10 G/DL — LOW (ref 11.5–15.5)
IGA FLD-MCNC: 299 MG/DL — SIGNIFICANT CHANGE UP (ref 84–499)
IGM SERPL-MCNC: 65 MG/DL — SIGNIFICANT CHANGE UP (ref 35–242)
LDH SERPL L TO P-CCNC: 269 U/L — HIGH (ref 50–242)
MCHC RBC-ENTMCNC: 23.1 PG — LOW (ref 27–34)
MCHC RBC-ENTMCNC: 30.1 GM/DL — LOW (ref 32–36)
MCV RBC AUTO: 76.7 FL — LOW (ref 80–100)
NRBC # BLD: 0 /100 WBCS — SIGNIFICANT CHANGE UP (ref 0–0)
PLATELET # BLD AUTO: 320 K/UL — SIGNIFICANT CHANGE UP (ref 150–400)
POTASSIUM SERPL-MCNC: 3.8 MMOL/L — SIGNIFICANT CHANGE UP (ref 3.5–5.3)
POTASSIUM SERPL-SCNC: 3.8 MMOL/L — SIGNIFICANT CHANGE UP (ref 3.5–5.3)
PROT SERPL-MCNC: 6.4 G/DL — SIGNIFICANT CHANGE UP (ref 6–8.3)
RBC # BLD: 4.33 M/UL — SIGNIFICANT CHANGE UP (ref 3.8–5.2)
RBC # FLD: 14.8 % — HIGH (ref 10.3–14.5)
SODIUM SERPL-SCNC: 141 MMOL/L — SIGNIFICANT CHANGE UP (ref 135–145)
T3 SERPL-MCNC: 150 NG/DL — SIGNIFICANT CHANGE UP (ref 80–200)
T4 AB SER-ACNC: 9.8 UG/DL — SIGNIFICANT CHANGE UP (ref 4.6–12)
TROPONIN T, HIGH SENSITIVITY RESULT: <6 NG/L — SIGNIFICANT CHANGE UP (ref 0–51)
TSH SERPL-MCNC: <0.01 UIU/ML — LOW (ref 0.27–4.2)
URATE SERPL-MCNC: 5.3 MG/DL — SIGNIFICANT CHANGE UP (ref 2.5–7)
WBC # BLD: 6.5 K/UL — SIGNIFICANT CHANGE UP (ref 3.8–10.5)
WBC # FLD AUTO: 6.5 K/UL — SIGNIFICANT CHANGE UP (ref 3.8–10.5)

## 2022-03-17 PROCEDURE — 99232 SBSQ HOSP IP/OBS MODERATE 35: CPT | Mod: GC

## 2022-03-17 PROCEDURE — 93306 TTE W/DOPPLER COMPLETE: CPT | Mod: 26

## 2022-03-17 PROCEDURE — 99233 SBSQ HOSP IP/OBS HIGH 50: CPT

## 2022-03-17 PROCEDURE — 93976 VASCULAR STUDY: CPT | Mod: 26

## 2022-03-17 PROCEDURE — 76700 US EXAM ABDOM COMPLETE: CPT | Mod: 26,59

## 2022-03-17 RX ORDER — ACETAMINOPHEN 500 MG
650 TABLET ORAL ONCE
Refills: 0 | Status: COMPLETED | OUTPATIENT
Start: 2022-03-17 | End: 2022-03-17

## 2022-03-17 RX ADMIN — ERGOCALCIFEROL 50000 UNIT(S): 1.25 CAPSULE ORAL at 13:02

## 2022-03-17 RX ADMIN — FAMOTIDINE 40 MILLIGRAM(S): 10 INJECTION INTRAVENOUS at 21:35

## 2022-03-17 RX ADMIN — Medication 1 TABLET(S): at 13:02

## 2022-03-17 RX ADMIN — Medication 650 MILLIGRAM(S): at 10:43

## 2022-03-17 RX ADMIN — Medication 650 MILLIGRAM(S): at 16:06

## 2022-03-17 NOTE — PROGRESS NOTE ADULT - SUBJECTIVE AND OBJECTIVE BOX
[facility] Division of Hospital Medicine  Jing Villa MD  Pager (M-F, 8A-5P): [phone]  Other Times:  [phone]    Patient is a 51y old  Female who presents with a chief complaint of     SUBJECTIVE / OVERNIGHT EVENTS: No acute events. Abdominal pain improved. Tolerating diet  ADDITIONAL REVIEW OF SYSTEMS: +blurry vision, +headaches    MEDICATIONS  (STANDING):  ergocalciferol 01315 Unit(s) Oral every week  famotidine    Tablet 40 milliGRAM(s) Oral at bedtime  methimazole 5 milliGRAM(s) Oral every 8 hours  multivitamin 1 Tablet(s) Oral daily    MEDICATIONS  (PRN):  ondansetron Injectable 4 milliGRAM(s) IV Push every 8 hours PRN Nausea and/or Vomiting      CAPILLARY BLOOD GLUCOSE        I&O's Summary    17 Mar 2022 07:01  -  17 Mar 2022 13:59  --------------------------------------------------------  IN: 220 mL / OUT: 0 mL / NET: 220 mL        PHYSICAL EXAM:  Vital Signs Last 24 Hrs  T(C): 36.7 (17 Mar 2022 07:37), Max: 36.8 (16 Mar 2022 20:24)  T(F): 98.1 (17 Mar 2022 07:37), Max: 98.3 (16 Mar 2022 20:24)  HR: 70 (17 Mar 2022 07:37) (70 - 89)  BP: 121/70 (17 Mar 2022 07:37) (118/66 - 121/70)  BP(mean): --  RR: 18 (17 Mar 2022 07:37) (18 - 18)  SpO2: 99% (17 Mar 2022 07:37) (98% - 99%)    CONSTITUTIONAL: NAD, well-developed, well-groomed  EYES: PERRLA; conjunctiva and sclera clear  ENMT: Moist oral mucosa, no pharyngeal injection or exudates; normal dentition  NECK: Supple, no palpable masses; no thyromegaly  RESPIRATORY: Normal respiratory effort; lungs are clear to auscultation bilaterally  CARDIOVASCULAR: Regular rate and rhythm, normal S1 and S2, no murmur/rub/gallop; No lower extremity edema; Peripheral pulses are 2+ bilaterally  ABDOMEN: mildly tender to palpation, normoactive bowel sounds, no rebound/guarding; No hepatosplenomegaly  MUSCULOSKELETAL:  Normal gait; no clubbing or cyanosis of digits; no joint swelling or tenderness to palpation  PSYCH: A+O to person, place, and time; affect appropriate  NEUROLOGY: CN 2-12 are intact and symmetric; no gross sensory deficits   SKIN: No rashes; no palpable lesions    LABS: reviewed                        10.0   6.50  )-----------( 320      ( 17 Mar 2022 06:59 )             33.2         141  |  107  |  8   ----------------------------<  87  3.8   |  25  |  0.65    Ca    9.5      17 Mar 2022 07:02    TPro  6.4  /  Alb  3.3  /  TBili  0.6  /  DBili  x   /  AST  178<H>  /  ALT  248<H>  /  AlkPhos  335<H>      PT/INR - ( 16 Mar 2022 09:36 )   PT: 13.1 sec;   INR: 1.14 ratio         PTT - ( 16 Mar 2022 09:36 )  PTT:29.3 sec  CARDIAC MARKERS ( 17 Mar 2022 07:02 )  x     / x     / 84 U/L / x     / 1.0 ng/mL      Urinalysis Basic - ( 16 Mar 2022 03:55 )    Color: Light Yellow / Appearance: Clear / S.009 / pH: x  Gluc: x / Ketone: Negative  / Bili: Negative / Urobili: Negative   Blood: x / Protein: Negative / Nitrite: Negative   Leuk Esterase: Negative / RBC: x / WBC x   Sq Epi: x / Non Sq Epi: x / Bacteria: x          RADIOLOGY & ADDITIONAL TESTS:  Results Reviewed: U/S abd

## 2022-03-17 NOTE — PROGRESS NOTE ADULT - SUBJECTIVE AND OBJECTIVE BOX
Lara Douglas MD  Internal Medicine, PGY-3    SUBJECTIVE / OVERNIGHT EVENTS:  - Pt seen and examined at bedside  - RYAN    MEDICATIONS  (STANDING):  ergocalciferol 84652 Unit(s) Oral every week  famotidine    Tablet 40 milliGRAM(s) Oral at bedtime  methimazole 5 milliGRAM(s) Oral every 8 hours  multivitamin 1 Tablet(s) Oral daily    MEDICATIONS  (PRN):  ondansetron Injectable 4 milliGRAM(s) IV Push every 8 hours PRN Nausea and/or Vomiting    PHYSICAL EXAM:  Vital Signs Last 24 Hrs  T(C): 36.7 (17 Mar 2022 07:37), Max: 36.8 (16 Mar 2022 20:24)  T(F): 98.1 (17 Mar 2022 07:37), Max: 98.3 (16 Mar 2022 20:24)  HR: 70 (17 Mar 2022 07:37) (70 - 89)  BP: 121/70 (17 Mar 2022 07:37) (118/66 - 121/70)  RR: 18 (17 Mar 2022 07:37) (18 - 18)  SpO2: 99% (17 Mar 2022 07:37) (98% - 99%)    GENERAL: NAD, breathing normal  HEAD:  Atraumatic, Normocephalic  EYES: conjunctiva and sclera clear  NECK: supple, No JVD  CHEST/LUNG: CTA b/l  HEART: S1 S2 RRR  ABDOMEN: +BS Soft, +epigastric and RUQ abdominal pain. negative do's sign, no rebound or guarding   EXTREMITIES:  2+ DP Pulses, No c/c. no LE edema  NEUROLOGY: AAOx3, no facial droop, moving all extremities  SKIN: No rashes or lesions    LABS:             10.0   6.50  )-----------( 320      ( 17 Mar 2022 06:59 )             33.2         141  |  107  |  8   ----------------------------<  87  3.8   |  25  |  0.65    Ca    9.5      17 Mar 2022 07:02    TPro  6.4  /  Alb  3.3  /  TBili  0.6  /  DBili  x   /  AST  178<H>  /  ALT  248<H>  /  AlkPhos  335<H>      PT/INR - ( 16 Mar 2022 09:36 )   PT: 13.1 sec;   INR: 1.14 ratio    PTT - ( 16 Mar 2022 09:36 )  PTT:29.3 sec    Urinalysis Basic - ( 16 Mar 2022 03:55 )  Color: Light Yellow / Appearance: Clear / S.009 / pH: x  Gluc: x / Ketone: Negative  / Bili: Negative / Urobili: Negative   Blood: x / Protein: Negative / Nitrite: Negative   Leuk Esterase: Negative / RBC: x / WBC x   Sq Epi: x / Non Sq Epi: x / Bacteria: x    IMAGING:  CT Abdomen and Pelvis w/ Oral Cont and w/ IV Cont (22 @ 05:32)  IMPRESSION:  - No bowel obstruction or inflammation.

## 2022-03-17 NOTE — PROGRESS NOTE ADULT - SUBJECTIVE AND OBJECTIVE BOX
Surgery Progress Note    Subjective:     Patient seen and examined at bedside. Patient without complaints this AM. Denies N/V/F/C.     OBJECTIVE:     T(C): 36.8 (03-16-22 @ 20:24), Max: 36.8 (03-16-22 @ 20:24)  HR: 89 (03-16-22 @ 20:24) (80 - 96)  BP: 118/66 (03-16-22 @ 20:24) (118/66 - 137/76)  RR: 18 (03-16-22 @ 20:24) (13 - 18)  SpO2: 98% (03-16-22 @ 20:24) (98% - 100%)  Wt(kg): --    I&O's Detail      PHYSICAL EXAM:    GENERAL: NAD, lying in bed comfortably  HEAD:  Atraumatic, Normocephalic  ENT: Moist mucous membranes  CHEST/LUNG: Unlabored respirations  ABDOMEN: Soft, Nontender, Nondistended.   NERVOUS SYSTEM:  Alert & Oriented X3, speech clear.   SKIN: No rashes or lesions    MEDICATIONS  (STANDING):  ergocalciferol 08724 Unit(s) Oral every week  famotidine    Tablet 40 milliGRAM(s) Oral at bedtime  methimazole 5 milliGRAM(s) Oral every 8 hours  multivitamin 1 Tablet(s) Oral daily    MEDICATIONS  (PRN):  ondansetron Injectable 4 milliGRAM(s) IV Push every 8 hours PRN Nausea and/or Vomiting      LABS:                          9.5    6.92  )-----------( 286      ( 16 Mar 2022 09:36 )             30.9     03-16    136  |  104  |  8   ----------------------------<  97  4.0   |  22  |  0.60    Ca    8.8      16 Mar 2022 09:36    TPro  6.1  /  Alb  3.3  /  TBili  0.7  /  DBili  x   /  AST  796<H>  /  ALT  367<H>  /  AlkPhos  289<H>  03-16    PT/INR - ( 16 Mar 2022 09:36 )   PT: 13.1 sec;   INR: 1.14 ratio         PTT - ( 16 Mar 2022 09:36 )  PTT:29.3 sec

## 2022-03-17 NOTE — PROGRESS NOTE ADULT - ASSESSMENT
52yo F w/ PMH of gastric sleeve and cholecystectomy in 2009, anxiety, anemia and recent (3 months ago) dx of hyperthyroidism on Methimazole p/w epigastric abd pain which improved after Toradol administration found to have elevated liver enzymes (hepatocellular > Cholestatic) for which hepatology was consulted.     # Assessment  - Given AST/ALT ratio w/ no EtOH consumption and c/o unintentional weight loss, night sweats, and enzymes elevation, ddx includes ischemic event vs malignant infiltration vs infectious vs AI given hyperthyroidism hx    # Recommendations   - MRI w/ contrast of abdomen to r/o malignancy  - f/u viral and AI w/u labs  - f/u US w/ doppler to evaluate biliary tree/ rule out portal vein thrombosis (Done this AM)  - f/u TTE to r/o cardiac etiology given acute onset of symptoms along w/ CPK, and cardiac enzymes (I called lab and added to AM labs)  - Trend CBC, INR, CMP   - Avoid all hepatotoxic agents  - Will continue to follow    Case d/w Dr. Young, hepatology attending

## 2022-03-17 NOTE — PROGRESS NOTE ADULT - ASSESSMENT
51F with PMHx anemia, anxiety, HTN and PSHx laparoscopic sleeve gastrectomy and cholecystectomy ( with Dr. Roscoe Boggs),  presenting with 1 day of epigastric pain.    Recommendations:  - No indication for surgical interventions  - Appreciate medicine recommendations  - F/u GI recommendations  - Continue regular diet  - Pain control PRN    Bariatric Surgery/Green Team  [id number] 51F with PMHx anemia, anxiety, HTN and PSHx laparoscopic sleeve gastrectomy and cholecystectomy ( with Dr. Roscoe Boggs),  presenting with 1 day of epigastric pain.    Recommendations:  - No indication for surgical interventions  - F/u GI recommendations  - please have patient follow up with her outpatient bariatric surgeon  provide patient with copy of CT abdomen pelvis imaging and report    Discussed with Dr. Medina    Bariatric Surgery/Green Team  [id number] 51F with PMHx anemia, anxiety, HTN and PSHx laparoscopic sleeve gastrectomy and cholecystectomy ( with Dr. Roscoe Boggs),  presenting with 1 day of epigastric pain.    epigastric pain improved    Recommendations:  - No indication for surgical interventions  - F/u GI recommendations  - please have patient follow up with her outpatient bariatric surgeon  provide patient with copy of CT abdomen pelvis imaging and report    Please call back [facility] SURG[facility] with any additional questions or concerns    Discussed with Dr. Medina    Bariatric Surgery/Green Team  [phone]

## 2022-03-17 NOTE — PROGRESS NOTE ADULT - ASSESSMENT
51F h/o gastric sleeve 2009, hyperthyroidism, cholecystectomy (patient unsure if she had gallstones), anxiety, anemia p/w sudden onset epigastric abdominal pain concern for biliary pathology.     Problem/Plan - 1:  ·  Problem: Epigastric abdominal pain.   ·  Plan: with transaminitis -now pain improved - ?passed stone   patient unable to complete MRCP - as she is claustrophobic   CT a/p - no stones or cbd dilatation seen   U/S with 8mm CBD, otherwise unremarkable  Appreciate hepatology recs - pending MRI under anesthesia  LFTs improving       Problem/Plan - 2:  ·  Problem: Hyperthyroidism.   ·  Plan: c/w methimazole   TSH low with normal T3/T4, will f/u with endocrinologist abt med adjustment.     Problem/Plan - 3:  ·  Problem: Blurry vision.   ·  Plan: pt was due for outpatient MRI under anesthesia  MRI head ordered     Problem/Plan - 4:  ·  Problem: Microcytic anemia  F/u iron panel

## 2022-03-18 DIAGNOSIS — R25.2 CRAMP AND SPASM: ICD-10-CM

## 2022-03-18 LAB
ALBUMIN SERPL ELPH-MCNC: 3.6 G/DL — SIGNIFICANT CHANGE UP (ref 3.3–5)
ALP SERPL-CCNC: 314 U/L — HIGH (ref 40–120)
ALT FLD-CCNC: 159 U/L — HIGH (ref 10–45)
ANA TITR SER: NEGATIVE — SIGNIFICANT CHANGE UP
ANION GAP SERPL CALC-SCNC: 11 MMOL/L — SIGNIFICANT CHANGE UP (ref 5–17)
AST SERPL-CCNC: 56 U/L — HIGH (ref 10–40)
BILIRUB SERPL-MCNC: 0.3 MG/DL — SIGNIFICANT CHANGE UP (ref 0.2–1.2)
BUN SERPL-MCNC: 12 MG/DL — SIGNIFICANT CHANGE UP (ref 7–23)
CALCIUM SERPL-MCNC: 9.8 MG/DL — SIGNIFICANT CHANGE UP (ref 8.4–10.5)
CHLORIDE SERPL-SCNC: 106 MMOL/L — SIGNIFICANT CHANGE UP (ref 96–108)
CO2 SERPL-SCNC: 26 MMOL/L — SIGNIFICANT CHANGE UP (ref 22–31)
CREAT SERPL-MCNC: 0.71 MG/DL — SIGNIFICANT CHANGE UP (ref 0.5–1.3)
EGFR: 104 ML/MIN/1.73M2 — SIGNIFICANT CHANGE UP
FERRITIN SERPL-MCNC: 63 NG/ML — SIGNIFICANT CHANGE UP (ref 15–150)
GLUCOSE SERPL-MCNC: 88 MG/DL — SIGNIFICANT CHANGE UP (ref 70–99)
IGG SERPL-MCNC: 1442 MG/DL — SIGNIFICANT CHANGE UP (ref 586–1602)
IGG1 SER-MCNC: 638 MG/DL — SIGNIFICANT CHANGE UP (ref 248–810)
IGG2 SER-MCNC: 447 MG/DL — SIGNIFICANT CHANGE UP (ref 130–555)
IGG3 SER-MCNC: 83 MG/DL — SIGNIFICANT CHANGE UP (ref 15–102)
IGG4 SER-MCNC: 110 MG/DL — HIGH (ref 2–96)
IRON SATN MFR SERPL: 15 % — SIGNIFICANT CHANGE UP (ref 14–50)
IRON SATN MFR SERPL: 40 UG/DL — SIGNIFICANT CHANGE UP (ref 30–160)
MAGNESIUM SERPL-MCNC: 2 MG/DL — SIGNIFICANT CHANGE UP (ref 1.6–2.6)
MITOCHONDRIA AB SER-ACNC: SIGNIFICANT CHANGE UP
PHOSPHATE SERPL-MCNC: 4 MG/DL — SIGNIFICANT CHANGE UP (ref 2.5–4.5)
POTASSIUM SERPL-MCNC: 4.2 MMOL/L — SIGNIFICANT CHANGE UP (ref 3.5–5.3)
POTASSIUM SERPL-SCNC: 4.2 MMOL/L — SIGNIFICANT CHANGE UP (ref 3.5–5.3)
PROT SERPL-MCNC: 6.7 G/DL — SIGNIFICANT CHANGE UP (ref 6–8.3)
SMOOTH MUSCLE AB SER-ACNC: SIGNIFICANT CHANGE UP
SODIUM SERPL-SCNC: 143 MMOL/L — SIGNIFICANT CHANGE UP (ref 135–145)
TIBC SERPL-MCNC: 257 UG/DL — SIGNIFICANT CHANGE UP (ref 220–430)
UIBC SERPL-MCNC: 217 UG/DL — SIGNIFICANT CHANGE UP (ref 110–370)
VIT B12 SERPL-MCNC: 675 PG/ML — SIGNIFICANT CHANGE UP (ref 232–1245)

## 2022-03-18 PROCEDURE — 99232 SBSQ HOSP IP/OBS MODERATE 35: CPT

## 2022-03-18 PROCEDURE — 99232 SBSQ HOSP IP/OBS MODERATE 35: CPT | Mod: GC

## 2022-03-18 PROCEDURE — 93970 EXTREMITY STUDY: CPT | Mod: 26

## 2022-03-18 RX ORDER — MAGNESIUM OXIDE 400 MG ORAL TABLET 241.3 MG
400 TABLET ORAL AT BEDTIME
Refills: 0 | Status: DISCONTINUED | OUTPATIENT
Start: 2022-03-18 | End: 2022-03-19

## 2022-03-18 RX ORDER — ACETAMINOPHEN 500 MG
650 TABLET ORAL ONCE
Refills: 0 | Status: COMPLETED | OUTPATIENT
Start: 2022-03-18 | End: 2022-03-18

## 2022-03-18 RX ADMIN — MAGNESIUM OXIDE 400 MG ORAL TABLET 400 MILLIGRAM(S): 241.3 TABLET ORAL at 21:48

## 2022-03-18 RX ADMIN — FAMOTIDINE 40 MILLIGRAM(S): 10 INJECTION INTRAVENOUS at 21:46

## 2022-03-18 RX ADMIN — Medication 650 MILLIGRAM(S): at 03:08

## 2022-03-18 RX ADMIN — Medication 1 TABLET(S): at 11:54

## 2022-03-18 RX ADMIN — Medication 650 MILLIGRAM(S): at 02:08

## 2022-03-18 NOTE — DIETITIAN INITIAL EVALUATION ADULT. - OTHER INFO
Pt reports fair appetite/PO intake, ~50% of meals consumed. Amenable to receiving oral nutritional supplement to optimize PO intake: Orgain 1x/day = 220 sharif, 16 Gm protein. Food preferences obtained; RD to honor as able.     GI: Denies N/V. No difficulty chewing/swallowing. Last BM was yesterday 03/17, denies diarrhea/constipation. Bowel regimen: none.      pounds x 4 months ago, endorses weight loss. Current admission weight 176.1 pounds (03/16). Indicates weight loss of 24% x 4 months; clinically significant. RD to continue to monitor weight trends as available/able.

## 2022-03-18 NOTE — PROGRESS NOTE ADULT - ASSESSMENT
50yo F w/ PMH of gastric sleeve and cholecystectomy in 2009, anxiety, anemia and recent (3 months ago) dx of hyperthyroidism on Methimazole p/w epigastric abd pain which improved after Toradol administration found to have elevated liver enzymes (hepatocellular > Cholestatic) for which hepatology was consulted.     # Assessment  - Given AST/ALT ratio w/ no EtOH consumption and c/o unintentional weight loss, night sweats, and enzymes elevation, ddx includes ischemic event vs malignant infiltration vs infectious vs AI given hyperthyroidism hx though w/u thus far unremarkable w/ improvement of enzyme levels    # Recommendations   - No further testing from hepatology standpoint regarding liver enzymes  - Avoid all hepatotoxic agents  - f/u outpt w/ hepatology in 1 month  - Please re-call if any further questions    Case d/w Dr. Young, hepatology attending

## 2022-03-18 NOTE — PROGRESS NOTE ADULT - SUBJECTIVE AND OBJECTIVE BOX
[facility] Division of Hospital Medicine  Jing Villa MD  Pager (M-F, 8A-5P): [phone]  Other Times:  [phone]    Patient is a 51y old  Female who presents with a chief complaint of abdominal pain    SUBJECTIVE / OVERNIGHT EVENTS: No acute events. Abdominal pain improved. Tolerating diet  ADDITIONAL REVIEW OF SYSTEMS: +blurry vision, +headaches    MEDICATIONS  (STANDING):  ergocalciferol 20371 Unit(s) Oral every week  famotidine    Tablet 40 milliGRAM(s) Oral at bedtime  methimazole 5 milliGRAM(s) Oral every 8 hours  multivitamin 1 Tablet(s) Oral daily    MEDICATIONS  (PRN):  ondansetron Injectable 4 milliGRAM(s) IV Push every 8 hours PRN Nausea and/or Vomiting      CAPILLARY BLOOD GLUCOSE      PHYSICAL EXAM:  Vital Signs Last 24 Hrs  T(C): 36.7 (03-18-22 @ 09:16), Max: 36.9 (03-17-22 @ 15:38)  T(F): 98.1 (03-18-22 @ 09:16), Max: 98.4 (03-17-22 @ 15:38)  HR: 71 (03-18-22 @ 09:16) (71 - 78)  BP: 111/74 (03-18-22 @ 09:16) (111/74 - 122/79)  RR: 18 (03-18-22 @ 09:16) (18 - 18)  SpO2: 98% (03-18-22 @ 09:16) (98% - 100%)  Wt(kg): --    CONSTITUTIONAL: NAD, well-developed, well-groomed  EYES: PERRLA; conjunctiva and sclera clear  ENMT: Moist oral mucosa, no pharyngeal injection or exudates; normal dentition  NECK: Supple, no palpable masses; no thyromegaly  RESPIRATORY: Normal respiratory effort; lungs are clear to auscultation bilaterally  CARDIOVASCULAR: Regular rate and rhythm, normal S1 and S2, no murmur/rub/gallop; No lower extremity edema; Peripheral pulses are 2+ bilaterally  ABDOMEN: mildly tender to palpation, normoactive bowel sounds, no rebound/guarding; No hepatosplenomegaly  MUSCULOSKELETAL:  Normal gait; no clubbing or cyanosis of digits; no joint swelling or tenderness to palpation  PSYCH: A+O to person, place, and time; affect appropriate  NEUROLOGY: CN 2-12 are intact and symmetric; no gross sensory deficits   SKIN: No rashes; no palpable lesions    LABS: reviewed                                10.0   6.50  )-----------( 320      ( 17 Mar 2022 06:59 )             33.2       03-18    143  |  106  |  12  ----------------------------<  88  4.2   |  26  |  0.71    Ca    9.8      18 Mar 2022 06:20  Phos  4.0     03-18  Mg     2.0     03-18    TPro  6.7  /  Alb  3.6  /  TBili  0.3  /  DBili  x   /  AST  56<H>  /  ALT  159<H>  /  AlkPhos  314<H>  03-18                      CARDIAC MARKERS ( 17 Mar 2022 07:02 )  x     / x     / 84 U/L / x     / 1.0 ng/mL        CAPILLARY BLOOD GLUCOSE

## 2022-03-18 NOTE — DIETITIAN INITIAL EVALUATION ADULT. - CHIEF COMPLAINT
Per chart "51F h/o gastric sleeve 2009, hyperthyroidism, cholecystectomy (patient unsure if she had gallstones), anxiety, anemia p/w sudden onset epigastric abdominal pain concern for biliary pathology."

## 2022-03-18 NOTE — DIETITIAN INITIAL EVALUATION ADULT. - ETIOLOGY
decreased ability to consume adequate protein-energy in setting of increased physiological demand for nutrients

## 2022-03-18 NOTE — DIETITIAN INITIAL EVALUATION ADULT. - ORAL INTAKE PTA/DIET HISTORY
Pt reports fair appetite/PO intake at baseline, Therapeutic restrictions/modifications: avoids rice, tomato, acidic foods (2/2 diverticulitis), NKFA/intolerances reported. Micronutrient supplementation: B12, vitamin Dm Protein-energy supplementation: none.

## 2022-03-18 NOTE — PROGRESS NOTE ADULT - ATTENDING COMMENTS
elevated liver enzymes AST > ALT  check TTE  CPK, troponin  unclear etiology at the moment  unlikely DILI from methimazole  70 pound unintentional weight loss is concerning for lymphoma or occult malignancy  suspect liver enzyme elevation is secondary  ? blurry vision
elevated liver enzymes AST > ALT that has spontaneously improved  heart eval, cpk, normal  unclear etiology at the moment  unlikely DILI from methimazole  70 pound unintentional weight loss is concerning for lymphoma or occult malignancy  suspect liver enzyme elevation is secondary  ? blurry vision, getting MRI brain  will sign off, we will set up f/u in liver clinic in a month

## 2022-03-18 NOTE — DIETITIAN INITIAL EVALUATION ADULT. - PERTINENT LABORATORY DATA
03-18 Na 143 mmol/L Glu 88 mg/dL K+ 4.2 mmol/L Cr 0.71 mg/dL BUN 12 mg/dL Phos 4.0 mg/dL Alb 3.6 g/dL PAB n/a   Hgb n/a   Hct n/a   HgA1C n/a    Glucose, Serum: 88 mg/dL 03-18 Na 143 mmol/L Glu 88 mg/dL K+ 4.2 mmol/L Cr 0.71 mg/dL BUN 12 mg/dL Phos 4.0 mg/dL Alb 3.6 g/dL Glucose, Serum: 88 mg/dL    Hgba1c 5.6%

## 2022-03-18 NOTE — PROVIDER CONTACT NOTE (CRITICAL VALUE NOTIFICATION) - ACTION/TREATMENT ORDERED:
Per nomogram, hold heparin gtt for 1 hour and then lower rate by three. Heparin was stopped for 1 hour and then changed from 16 to 13ml/hr. Provider made aware and approved this change based on the protocol.

## 2022-03-18 NOTE — DIETITIAN NUTRITION RISK NOTIFICATION - TREATMENT: THE FOLLOWING DIET HAS BEEN RECOMMENDED
Diet, NPO after Midnight:      NPO Start Date: 18-Mar-2022,   NPO Start Time: 23:59  Except Medications (03-18-22 @ 08:49) [Active]  Diet, Regular:   DASH/TLC {Sodium & Cholesterol Restricted} (DASH) (03-16-22 @ 11:38) [Active]

## 2022-03-18 NOTE — DIETITIAN INITIAL EVALUATION ADULT. - PERSON TAUGHT/METHOD
Discussed importance of adequate consumption of meals/supplements to optimize protein-energy intake. Encouraged small/frequent meals, nutrient dense snacks, prioritizing protein foods at meal time./verbal instruction/individual instruction/patient instructed/teach back - (Patient repeats in own words)

## 2022-03-18 NOTE — DIETITIAN INITIAL EVALUATION ADULT. - PROBLEM SELECTOR PLAN 5
given ativan in ED  ISTOP checked Ref#[id number] - nothing prescribed recently  will continue to monitor

## 2022-03-18 NOTE — DIETITIAN INITIAL EVALUATION ADULT. - ADD RECOMMEND
1) New malnutrition notification sent. 2) Liberalize diet to optimize PO intake: regular. Monitor for need of additional restrictions. 3) Add Orgain 1x/day. 3) Add multivitamin to optimize micronutrient intake. 4) Encourage adequate consumption of meals/supplements to optimize protein-energy intake. 5) Monitor nutritional intake/tolerance, weights, labs, hydration status, skin integrity, BM, GI symptoms.

## 2022-03-18 NOTE — DIETITIAN INITIAL EVALUATION ADULT. - PROBLEM SELECTOR PLAN 1
with transaminitis -now pain improved - ?passed stone   patient unable to complete MRCP - as she is claustrophobic   CT a/p - no stones or cbd dilitation seen   will check US abd w/ duplex for now   GI consult called (house)  monitor LFTs  check hepatitis panel

## 2022-03-18 NOTE — DIETITIAN INITIAL EVALUATION ADULT. - PERTINENT MEDS FT
MEDICATIONS  (STANDING):  ergocalciferol 10582 Unit(s) Oral every week  famotidine    Tablet 40 milliGRAM(s) Oral at bedtime  magnesium oxide 400 milliGRAM(s) Oral at bedtime  methimazole 5 milliGRAM(s) Oral every 8 hours  multivitamin 1 Tablet(s) Oral daily    MEDICATIONS  (PRN):  ondansetron Injectable 4 milliGRAM(s) IV Push every 8 hours PRN Nausea and/or Vomiting

## 2022-03-18 NOTE — PROGRESS NOTE ADULT - ASSESSMENT
51F h/o gastric sleeve 2009, hyperthyroidism, cholecystectomy (patient unsure if she had gallstones), anxiety, anemia p/w sudden onset epigastric abdominal pain concern for biliary pathology.

## 2022-03-18 NOTE — PROGRESS NOTE ADULT - SUBJECTIVE AND OBJECTIVE BOX
Lara Douglas MD  Internal Medicine, PGY-3    SUBJECTIVE / OVERNIGHT EVENTS:  - Pt seen and examined at bedside  - NAEON  - Liver enzymes downtrending  - w/u thus far unremarkable as clear cause of liver enzyme elevation    MEDICATIONS  (STANDING):  ergocalciferol 49021 Unit(s) Oral every week  famotidine    Tablet 40 milliGRAM(s) Oral at bedtime  methimazole 5 milliGRAM(s) Oral every 8 hours  multivitamin 1 Tablet(s) Oral daily    MEDICATIONS  (PRN):  ondansetron Injectable 4 milliGRAM(s) IV Push every 8 hours PRN Nausea and/or Vomiting    PHYSICAL EXAM:  Vital Signs Last 24 Hrs  T(C): 36.7 (17 Mar 2022 07:37), Max: 36.8 (16 Mar 2022 20:24)  T(F): 98.1 (17 Mar 2022 07:37), Max: 98.3 (16 Mar 2022 20:24)  HR: 70 (17 Mar 2022 07:37) (70 - 89)  BP: 121/70 (17 Mar 2022 07:37) (118/66 - 121/70)  RR: 18 (17 Mar 2022 07:37) (18 - 18)  SpO2: 99% (17 Mar 2022 07:37) (98% - 99%)    GENERAL: NAD, breathing normal  HEAD:  Atraumatic, Normocephalic  EYES: conjunctiva and sclera clear  NECK: supple, No JVD  CHEST/LUNG: CTA b/l  HEART: S1 S2 RRR  ABDOMEN: +BS Soft, +epigastric and RUQ abdominal pain. negative do's sign, no rebound or guarding   EXTREMITIES:  2+ DP Pulses, No c/c. no LE edema  NEUROLOGY: AAOx3, no facial droop, moving all extremities  SKIN: No rashes or lesions    LABS:             10.0   6.50  )-----------( 320      ( 17 Mar 2022 06:59 )             33.2     03-    141  |  107  |  8   ----------------------------<  87  3.8   |  25  |  0.65    Ca    9.5      17 Mar 2022 07:02    TPro  6.4  /  Alb  3.3  /  TBili  0.6  /  DBili  x   /  AST  178<H>  /  ALT  248<H>  /  AlkPhos  335<H>  03-17    PT/INR - ( 16 Mar 2022 09:36 )   PT: 13.1 sec;   INR: 1.14 ratio    PTT - ( 16 Mar 2022 09:36 )  PTT:29.3 sec    Urinalysis Basic - ( 16 Mar 2022 03:55 )  Color: Light Yellow / Appearance: Clear / S.009 / pH: x  Gluc: x / Ketone: Negative  / Bili: Negative / Urobili: Negative   Blood: x / Protein: Negative / Nitrite: Negative   Leuk Esterase: Negative / RBC: x / WBC x   Sq Epi: x / Non Sq Epi: x / Bacteria: x    IMAGING:  CT Abdomen and Pelvis w/ Oral Cont and w/ IV Cont (22 @ 05:32)  IMPRESSION:  - No bowel obstruction or inflammation. Lara Douglas MD  Internal Medicine, PGY-3    SUBJECTIVE / OVERNIGHT EVENTS:  - Pt seen and examined at bedside  - NAEON  - Liver enzymes downtrending  - w/u thus far unremarkable as clear cause of liver enzyme elevation    MEDICATIONS  (STANDING):  ergocalciferol 92970 Unit(s) Oral every week  famotidine    Tablet 40 milliGRAM(s) Oral at bedtime  methimazole 5 milliGRAM(s) Oral every 8 hours  multivitamin 1 Tablet(s) Oral daily    MEDICATIONS  (PRN):  ondansetron Injectable 4 milliGRAM(s) IV Push every 8 hours PRN Nausea and/or Vomiting    PHYSICAL EXAM:  Vital Signs Last 24 Hrs  T(C): 36.7 (18 Mar 2022 09:16), Max: 36.9 (17 Mar 2022 15:38)  T(F): 98.1 (18 Mar 2022 09:16), Max: 98.4 (17 Mar 2022 15:38)  HR: 71 (18 Mar 2022 09:16) (71 - 78)  BP: 111/74 (18 Mar 2022 09:16) (111/74 - 122/79)  RR: 18 (18 Mar 2022 09:16) (18 - 18)  SpO2: 98% (18 Mar 2022 09:16) (98% - 100%)    GENERAL: NAD, breathing normal  HEAD:  Atraumatic, Normocephalic  EYES: conjunctiva and sclera clear  NECK: supple, No JVD  CHEST/LUNG: CTA b/l  HEART: S1 S2 RRR  ABDOMEN: +BS Soft, +epigastric and RUQ abdominal pain. negative do's sign, no rebound or guarding   EXTREMITIES:  2+ DP Pulses, No c/c. no LE edema  NEUROLOGY: AAOx3, no facial droop, moving all extremities  SKIN: No rashes or lesions    LABS:                      10.0   6.50  )-----------( 320      ( 17 Mar 2022 06:59 )             33.2     03-18    143  |  106  |  12  ----------------------------<  88  4.2   |  26  |  0.71    Ca    9.8      18 Mar 2022 06:20  Phos  4.0     03-18  Mg     2.0     03-18    TPro  6.7  /  Alb  3.6  /  TBili  0.3  /  DBili  x   /  AST  56<H>  /  ALT  159<H>  /  AlkPhos  314<H>  03-18    CARDIAC MARKERS ( 17 Mar 2022 07:02 )  x     / x     / 84 U/L / x     / 1.0 ng/mL    LIVER FUNCTIONS - ( 18 Mar 2022 06:20 )  Alb: 3.6 g/dL / Pro: 6.7 g/dL / ALK PHOS: 314 U/L / ALT: 159 U/L / AST: 56 U/L / GGT: x           IMAGING:  CT Abdomen and Pelvis w/ Oral Cont and w/ IV Cont (03.16.22 @ 05:32)  IMPRESSION:  - No bowel obstruction or inflammation.    US Abdomen Doppler (03.17.22 @ 10:07)  IMPRESSION:  - Patent portal venous system.  - Patent hepatic veins and hepatic artery.  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    US Abdomen Complete (US Abdomen Complete .) (03.17.22 @ 09:59)  IMPRESSION:  - Mild intrahepatic biliary ductal dilation and mild dilation of the mid CBD up to 8 mm appearing to taper distally, , commonly seen in the setting of cholecystectomy.

## 2022-03-19 ENCOUNTER — TRANSCRIPTION ENCOUNTER (OUTPATIENT)
Age: 52
End: 2022-03-19

## 2022-03-19 VITALS
SYSTOLIC BLOOD PRESSURE: 105 MMHG | TEMPERATURE: 99 F | DIASTOLIC BLOOD PRESSURE: 69 MMHG | HEART RATE: 108 BPM | OXYGEN SATURATION: 98 % | RESPIRATION RATE: 18 BRPM

## 2022-03-19 DIAGNOSIS — Z29.9 ENCOUNTER FOR PROPHYLACTIC MEASURES, UNSPECIFIED: ICD-10-CM

## 2022-03-19 PROCEDURE — 70553 MRI BRAIN STEM W/O & W/DYE: CPT | Mod: 26

## 2022-03-19 PROCEDURE — 70553 MRI BRAIN STEM W/O & W/DYE: CPT

## 2022-03-19 PROCEDURE — 36415 COLL VENOUS BLD VENIPUNCTURE: CPT

## 2022-03-19 PROCEDURE — 93970 EXTREMITY STUDY: CPT

## 2022-03-19 PROCEDURE — 85025 COMPLETE CBC W/AUTO DIFF WBC: CPT

## 2022-03-19 PROCEDURE — 93306 TTE W/DOPPLER COMPLETE: CPT

## 2022-03-19 PROCEDURE — 84484 ASSAY OF TROPONIN QUANT: CPT

## 2022-03-19 PROCEDURE — 83690 ASSAY OF LIPASE: CPT

## 2022-03-19 PROCEDURE — 96374 THER/PROPH/DIAG INJ IV PUSH: CPT

## 2022-03-19 PROCEDURE — 83550 IRON BINDING TEST: CPT

## 2022-03-19 PROCEDURE — 84100 ASSAY OF PHOSPHORUS: CPT

## 2022-03-19 PROCEDURE — 74177 CT ABD & PELVIS W/CONTRAST: CPT | Mod: MA

## 2022-03-19 PROCEDURE — 86038 ANTINUCLEAR ANTIBODIES: CPT

## 2022-03-19 PROCEDURE — 84480 ASSAY TRIIODOTHYRONINE (T3): CPT

## 2022-03-19 PROCEDURE — 99232 SBSQ HOSP IP/OBS MODERATE 35: CPT

## 2022-03-19 PROCEDURE — 80074 ACUTE HEPATITIS PANEL: CPT

## 2022-03-19 PROCEDURE — 82803 BLOOD GASES ANY COMBINATION: CPT

## 2022-03-19 PROCEDURE — 83036 HEMOGLOBIN GLYCOSYLATED A1C: CPT

## 2022-03-19 PROCEDURE — 85730 THROMBOPLASTIN TIME PARTIAL: CPT

## 2022-03-19 PROCEDURE — 82607 VITAMIN B-12: CPT

## 2022-03-19 PROCEDURE — 83540 ASSAY OF IRON: CPT

## 2022-03-19 PROCEDURE — 99285 EMERGENCY DEPT VISIT HI MDM: CPT

## 2022-03-19 PROCEDURE — 83735 ASSAY OF MAGNESIUM: CPT

## 2022-03-19 PROCEDURE — 80053 COMPREHEN METABOLIC PANEL: CPT

## 2022-03-19 PROCEDURE — 96375 TX/PRO/DX INJ NEW DRUG ADDON: CPT

## 2022-03-19 PROCEDURE — 84702 CHORIONIC GONADOTROPIN TEST: CPT

## 2022-03-19 PROCEDURE — 83615 LACTATE (LD) (LDH) ENZYME: CPT

## 2022-03-19 PROCEDURE — 76700 US EXAM ABDOM COMPLETE: CPT

## 2022-03-19 PROCEDURE — 85027 COMPLETE CBC AUTOMATED: CPT

## 2022-03-19 PROCEDURE — 82550 ASSAY OF CK (CPK): CPT

## 2022-03-19 PROCEDURE — 84443 ASSAY THYROID STIM HORMONE: CPT

## 2022-03-19 PROCEDURE — U0003: CPT

## 2022-03-19 PROCEDURE — 82728 ASSAY OF FERRITIN: CPT

## 2022-03-19 PROCEDURE — 82553 CREATINE MB FRACTION: CPT

## 2022-03-19 PROCEDURE — 93975 VASCULAR STUDY: CPT

## 2022-03-19 PROCEDURE — 86381 MITOCHONDRIAL ANTIBODY EACH: CPT

## 2022-03-19 PROCEDURE — 84550 ASSAY OF BLOOD/URIC ACID: CPT

## 2022-03-19 PROCEDURE — 85610 PROTHROMBIN TIME: CPT

## 2022-03-19 PROCEDURE — U0005: CPT

## 2022-03-19 PROCEDURE — 82787 IGG 1 2 3 OR 4 EACH: CPT

## 2022-03-19 PROCEDURE — 86255 FLUORESCENT ANTIBODY SCREEN: CPT

## 2022-03-19 PROCEDURE — 81003 URINALYSIS AUTO W/O SCOPE: CPT

## 2022-03-19 PROCEDURE — 82784 ASSAY IGA/IGD/IGG/IGM EACH: CPT

## 2022-03-19 PROCEDURE — 84436 ASSAY OF TOTAL THYROXINE: CPT

## 2022-03-19 RX ORDER — LISINOPRIL 2.5 MG/1
1 TABLET ORAL
Qty: 0 | Refills: 0 | DISCHARGE

## 2022-03-19 RX ORDER — CHLORHEXIDINE GLUCONATE 213 G/1000ML
1 SOLUTION TOPICAL DAILY
Refills: 0 | Status: DISCONTINUED | OUTPATIENT
Start: 2022-03-19 | End: 2022-03-19

## 2022-03-19 RX ORDER — MAGNESIUM OXIDE 400 MG ORAL TABLET 241.3 MG
1 TABLET ORAL
Qty: 0 | Refills: 0 | DISCHARGE
Start: 2022-03-19

## 2022-03-19 RX ORDER — MOXIFLOXACIN HYDROCHLORIDE TABLETS, 400 MG 400 MG/1
1 TABLET, FILM COATED ORAL
Qty: 0 | Refills: 0 | DISCHARGE

## 2022-03-19 RX ORDER — IBUPROFEN 200 MG
1 TABLET ORAL
Qty: 0 | Refills: 0 | DISCHARGE

## 2022-03-19 RX ORDER — ONDANSETRON 8 MG/1
4 TABLET, FILM COATED ORAL ONCE
Refills: 0 | Status: DISCONTINUED | OUTPATIENT
Start: 2022-03-19 | End: 2022-03-19

## 2022-03-19 RX ADMIN — Medication 1 TABLET(S): at 13:00

## 2022-03-19 NOTE — PRE-ANESTHESIA EVALUATION ADULT - NSANTHPMHFT_GEN_ALL_CORE
Patient under moderate stress from having her dog stolen  Visibly anxious, tremors of arms Patient under moderate stress from having her dog stolen a few days prior to admission  Visibly anxious, tremors of arms  Unintentional 70 lbs weight loss corroborated as well

## 2022-03-19 NOTE — CONSULT NOTE ADULT - PROBLEM SELECTOR RECOMMENDATION 9
Will continue current Methimazole dose for now, will FU.  Suggest to repeat thyroid function test in 4-6 weeks. Outpatient follow up.

## 2022-03-19 NOTE — DISCHARGE NOTE NURSING/CASE MANAGEMENT/SOCIAL WORK - PATIENT PORTAL LINK FT
You can access the FollowMyHealth Patient Portal offered by [facility] by registering at the following website: [email or web address]. By joining [facility]’s FollowMyHealth portal, you will also be able to view your health information using other applications (apps) compatible with our system.

## 2022-03-19 NOTE — PRE-ANESTHESIA EVALUATION ADULT - NSANTHAIRWAYFT_ENT_ALL_CORE
Mouth opening: >2cm  Thyromental distance: < 3FB  Upper lip bite: adequate  Cervical ROM: grossly intact  Visibly enlarged tonsils bilaterally

## 2022-03-19 NOTE — PROVIDER CONTACT NOTE (OTHER) - SITUATION
Pt with Left leg/posterior knee and wrist pain
Pt is anxious about MRI. Stating that she does not want to have MRI done because she is too nervous.

## 2022-03-19 NOTE — PROGRESS NOTE ADULT - PROBLEM SELECTOR PLAN 1
with transaminitis -now pain improved - ?passed stone   CT a/p - no stones or cbd dilatation seen   U/S with 8mm CBD, otherwise unremarkable  Appreciate hepatology recs - no further recommendations for MRI  LFTs improving
with transaminitis -now pain improved - ?passed stone   CT a/p - no stones or cbd dilatation seen   U/S with 8mm CBD, otherwise unremarkable  Appreciate hepatology recs - no further recommendations for MRI  LFTs improving

## 2022-03-19 NOTE — CONSULT NOTE ADULT - SUBJECTIVE AND OBJECTIVE BOX
HPI:  51F h/o gastric sleeve 2009, hyperthyroidism, cholecystectomy (patient unsure if she had gallstones), anxiety, anemia p/w sudden onset epigastric abdominal pain that began last night. no radiation of pain. no associated nausea, vomiting, diarrhea. pain improved after getting toradol in ED. patient denies any fevers or chills. patient states she occasionally gets abdominal pain for which she takes cipro for 2-3 days and resolves (?being treated by pcp for diverticulitis) - however, this pain is different. She did take the cipro with the pain last night without improvement. patient states she had covid in 2020 and subsequently developed left hand pain and right neck/shoulder pain that has been worked up as outpatient without clear cause. patient also c/o b/l blurry vision that she feels might be slightly worsening which she has seen opthalmology as outpatient and given glasses that "are not helping".  (16 Mar 2022 11:29)  Patient has history of Hyperthyroidism on Methimazole 15mg daily.  Patient follows up with PCP for health management.    PAST MEDICAL & SURGICAL HISTORY:  Migraines    Anemia    Anxiety    Migraines    Hypertension    Gastric banding status    S/P cholecystectomy    History of cholecystectomy        FAMILY HISTORY:  FH: lung cancer (Father)    Family history of pancreatic cancer (Father)    FH: type 2 diabetes (Mother)        Social History:    Outpatient Medications:    MEDICATIONS  (STANDING):  ergocalciferol 33134 Unit(s) Oral every week  famotidine    Tablet 40 milliGRAM(s) Oral at bedtime  magnesium oxide 400 milliGRAM(s) Oral at bedtime  methimazole 5 milliGRAM(s) Oral every 8 hours  multivitamin 1 Tablet(s) Oral daily    MEDICATIONS  (PRN):  ondansetron Injectable 4 milliGRAM(s) IV Push every 8 hours PRN Nausea and/or Vomiting  ondansetron Injectable 4 milliGRAM(s) IV Push once PRN Nausea and/or Vomiting      Allergies    Augmentin (Unknown)  morphine (Short breath)  morphine (Stomach Upset)  oxycodone (Rash)    Intolerances      Review of Systems:  Constitutional: No fever, no chills  Eyes: No blurry vision  Neuro: No tremors  HEENT: No pain, no neck swelling  Cardiovascular: No chest pain, no palpitations  Respiratory: No SOB, no cough  GI: No nausea, vomiting, abdominal pain  : No dysuria  Skin: no rash  MSK: Has leg swelling.  Psych: no depression  Endocrine: no polyuria, polydipsia    UNABLE TO OBTAIN    ALL OTHER SYSTEMS REVIEWED AND NEGATIVE        PHYSICAL EXAM:  VITALS: T(C): 36.3 (03-19-22 @ 10:45)  T(F): 97.3 (03-19-22 @ 10:45), Max: 98 (03-19-22 @ 00:41)  HR: 66 (03-19-22 @ 11:45) (55 - 80)  BP: 115/76 (03-19-22 @ 11:45) (106/58 - 129/66)  RR:  (16 - 18)  SpO2:  (98% - 100%)  Wt(kg): --  GENERAL: NAD, well-groomed, well-developed  EYES: No proptosis, no lid lag  HEENT:  Atraumatic, Normocephalic  THYROID: Normal size, no palpable nodules  RESPIRATORY: Clear to auscultation bilaterally; No rales, rhonchi, wheezing  CARDIOVASCULAR: Si S2, No murmurs;  GI: Soft, non distended, normal bowel sounds  SKIN: Dry, intact, No rashes or lesions  MUSCULOSKELETAL: Has BL lower extremity edema.  NEURO:  no tremor, sensation decreased in feet BL,                              10.0   6.50  )-----------( 320      ( 17 Mar 2022 06:59 )             33.2       03-18    143  |  106  |  12  ----------------------------<  88  4.2   |  26  |  0.71    EGFR if : x   EGFR if non : x     Ca    9.8      03-18  Mg     2.0     03-18  Phos  4.0     03-18    TPro  6.7  /  Alb  3.6  /  TBili  0.3  /  DBili  x   /  AST  56<H>  /  ALT  159<H>  /  AlkPhos  314<H>  03-18      Thyroid Function Tests:  03-17 @ 08:17 TSH <0.01 FreeT4 -- T3 150 Anti TPO -- Anti Thyroglobulin Ab -- TSI --  03-16 @ 08:43 TSH <0.01 FreeT4 -- T3 -- Anti TPO -- Anti Thyroglobulin Ab -- TSI --              Radiology:

## 2022-03-19 NOTE — PROVIDER CONTACT NOTE (OTHER) - ACTION/TREATMENT ORDERED:
Tylenol ordered and administered. No other orders at this time. Informed pt to notify provider if pain worsens.
Per provider, will pass on in report to next shift. No new orders at this time.

## 2022-03-19 NOTE — DISCHARGE NOTE PROVIDER - NSDCCPCAREPLAN_GEN_ALL_CORE_FT
PRINCIPAL DISCHARGE DIAGNOSIS  Diagnosis: Abdominal pain  Assessment and Plan of Treatment: 51F h/o gastric sleeve 2009, hyperthyroidism, cholecystectomy (patient unsure if she had gallstones), anxiety, anemia p/w sudden onset epigastric abdominal pain concern for biliary pathology.  Abdominal pain with elevated liver enzyme. -now pain improved , likely gall stone passed.  CT abdomen and pelvis - no stones or common biliary ductal dilation seen  U/S with 8mm CBD, otherwise unremarkable  Appreciate hepatology recs - no further inpatient work up needed  as per hepatology   Liver enzymes improving.        SECONDARY DISCHARGE DIAGNOSES  Diagnosis: Blurry vision  Assessment and Plan of Treatment: MRI head    Diagnosis: Bilateral leg cramps  Assessment and Plan of Treatment: Bilateral LE doppler studies with no blood clots    Diagnosis: Hyperthyroidism  Assessment and Plan of Treatment: Continue current medications and follow up with your PCP     PRINCIPAL DISCHARGE DIAGNOSIS  Diagnosis: Abdominal pain  Assessment and Plan of Treatment: 51F h/o gastric sleeve 2009, hyperthyroidism, cholecystectomy (patient unsure if she had gallstones), anxiety, anemia p/w sudden onset epigastric abdominal pain concern for biliary pathology.  Abdominal pain with elevated liver enzyme. -now pain improved , likely gall stone passed.  CT abdomen and pelvis - no stones or common biliary ductal dilation seen  U/S with 8mm CBD, otherwise unremarkable  Appreciate hepatology recs - no further inpatient work up needed  as per hepatology   Liver enzymes improving.        SECONDARY DISCHARGE DIAGNOSES  Diagnosis: Blurry vision  Assessment and Plan of Treatment: MRI head    Diagnosis: Bilateral leg cramps  Assessment and Plan of Treatment: Bilateral LE doppler studies with no blood clots    Diagnosis: Hyperthyroidism  Assessment and Plan of Treatment: Continue current medications and follow up with your PCP    Diagnosis: Transaminitis  Assessment and Plan of Treatment: elevated liver enzymes AST > ALT that has spontaneously improved  unclear etiology at the moment  unlikely DILI (drug induced liver injury)from methimazole  Hepatology will set up f/u in liver clinic in a month .       PRINCIPAL DISCHARGE DIAGNOSIS  Diagnosis: Abdominal pain  Assessment and Plan of Treatment: 51F h/o gastric sleeve 2009, hyperthyroidism, cholecystectomy (patient unsure if she had gallstones), anxiety, anemia p/w sudden onset epigastric abdominal pain concern for biliary pathology.  Abdominal pain with elevated liver enzyme. -now pain improved , likely gall stone passed.  CT abdomen and pelvis - no stones or common biliary ductal dilation seen  U/S with 8mm CBD, otherwise unremarkable  Appreciate hepatology recs - no further inpatient work up needed  as per hepatology   Liver enzymes improving.        SECONDARY DISCHARGE DIAGNOSES  Diagnosis: Blurry vision  Assessment and Plan of Treatment: MRI head    Diagnosis: Bilateral leg cramps  Assessment and Plan of Treatment: Bilateral LE doppler studies with no blood clots    Diagnosis: Hyperthyroidism  Assessment and Plan of Treatment:   Will continue current Methimazole dose for now, will FU.  Suggest to repeat thyroid function test in 4-6 weeks. Outpatient follow up, you may choose Dr buitrago follow up with your PCP      Diagnosis: Transaminitis  Assessment and Plan of Treatment: elevated liver enzymes AST > ALT that has spontaneously improved  unclear etiology at the moment  unlikely DILI (drug induced liver injury)from methimazole  Hepatology will set up f/u in liver clinic in a month .       PRINCIPAL DISCHARGE DIAGNOSIS  Diagnosis: Abdominal pain  Assessment and Plan of Treatment: 51F h/o gastric sleeve 2009, hyperthyroidism, cholecystectomy (patient unsure if she had gallstones), anxiety, anemia p/w sudden onset epigastric abdominal pain concern for biliary pathology.  Abdominal pain with elevated liver enzyme. -now pain improved , likely gall stone passed.  CT abdomen and pelvis - no stones or common biliary ductal dilation seen  U/S with 8mm CBD, otherwise unremarkable  Appreciate hepatology recs - no further inpatient work up needed  as per hepatology   Liver enzymes improving.  You may also follow up with your bariatric surgeon also        SECONDARY DISCHARGE DIAGNOSES  Diagnosis: Blurry vision  Assessment and Plan of Treatment: MRI head with no abnormal findings. If blurry continues please see an eye doctor. Follow up with your PCP in 1 week    Diagnosis: Bilateral leg cramps  Assessment and Plan of Treatment: Bilateral LE doppler studies with no blood clots    Diagnosis: Hyperthyroidism  Assessment and Plan of Treatment:   Will continue current Methimazole dose for now, will FU.  Suggest to repeat thyroid function test in 4-6 weeks. Outpatient follow up, you may choose Dr buitrago follow up with your PCP      Diagnosis: Transaminitis  Assessment and Plan of Treatment: elevated liver enzymes AST > ALT that has spontaneously improved  unclear etiology at the moment  unlikely DILI (drug induced liver injury)from methimazole  Hepatology will set up f/u in liver clinic in a month .

## 2022-03-19 NOTE — PROGRESS NOTE ADULT - NUTRITIONAL ASSESSMENT
This patient has been assessed with a concern for Malnutrition and has been determined to have a diagnosis/diagnoses of Severe protein-calorie malnutrition.    This patient is being managed with:   Diet Regular-  Entered: Mar 18 2022  6:47PM

## 2022-03-19 NOTE — DISCHARGE NOTE PROVIDER - CARE PROVIDERS DIRECT ADDRESSES
,DirectAddress_Unknown ,DirectAddress_Unknown,DirectAddress_Unknown,[email or web address]

## 2022-03-19 NOTE — PROGRESS NOTE ADULT - NSPROGADDITIONALINFOA_GEN_ALL_CORE
Dispo: likely to home after MRI over the weekend if normal
above plans discussed with NP Isidra Thao MD  Division of Hospital Medicine    Contact via [employer] Teams  Office: [phone]

## 2022-03-19 NOTE — PROVIDER CONTACT NOTE (OTHER) - ASSESSMENT
Pt appears nervous, shaking and crying. Pt also reported pain at the IV site on the left arm so she wanted me to take it out. When I took it out and went to put in a new one she told me to wait until later.
Pt reports pain on the left thigh which radiates down to the posterior knee area. Also c/o left wrist pain

## 2022-03-19 NOTE — PROGRESS NOTE ADULT - SUBJECTIVE AND OBJECTIVE BOX
Patient is a 51y old  Female who presents with a chief complaint of Abdominal pain (18 Mar 2022 18:27)      SUBJECTIVE / OVERNIGHT EVENTS:  no acute events overnight, vss, afebrile  pt scheduled for MRI brain this morning    ROS:  14 point ROS negative in detail except stated as above    MEDICATIONS  (STANDING):  chlorhexidine 2% Cloths 1 Application(s) Topical daily  ergocalciferol 54630 Unit(s) Oral every week  famotidine    Tablet 40 milliGRAM(s) Oral at bedtime  magnesium oxide 400 milliGRAM(s) Oral at bedtime  methimazole 5 milliGRAM(s) Oral every 8 hours  multivitamin 1 Tablet(s) Oral daily    MEDICATIONS  (PRN):  ondansetron Injectable 4 milliGRAM(s) IV Push every 8 hours PRN Nausea and/or Vomiting      CAPILLARY BLOOD GLUCOSE        I&O's Summary      PHYSICAL EXAM:  Vital Signs Last 24 Hrs  T(C): 36.9 (19 Mar 2022 14:00), Max: 37.1 (19 Mar 2022 13:00)  T(F): 98.5 (19 Mar 2022 14:00), Max: 98.8 (19 Mar 2022 13:00)  HR: 84 (19 Mar 2022 14:00) (55 - 84)  BP: 103/52 (19 Mar 2022 14:00) (103/52 - 129/66)  BP(mean): 91 (19 Mar 2022 11:45) (79 - 91)  RR: 18 (19 Mar 2022 14:00) (16 - 18)  SpO2: 98% (19 Mar 2022 14:00) (98% - 100%)    GENERAL: NAD, well-developed  HEAD:  Atraumatic, Normocephalic  EYES: EOMI, PERRLA, conjunctiva and sclera clear  NECK: Supple, No JVD  CHEST/LUNG: Clear to auscultation bilaterally; No wheeze  HEART: Regular rate and rhythm; No murmurs, rubs, or gallops  ABDOMEN: Soft, Nontender, Nondistended; Bowel sounds present  EXTREMITIES:  2+ Peripheral Pulses, No clubbing, cyanosis, or edema  NEUROLOGY: AAOx3; non-focal  SKIN: No rashes or lesions    LABS:  personally reviewed    03-18    143  |  106  |  12  ----------------------------<  88  4.2   |  26  |  0.71    Ca    9.8      18 Mar 2022 06:20  Phos  4.0     03-18  Mg     2.0     03-18    TPro  6.7  /  Alb  3.6  /  TBili  0.3  /  DBili  x   /  AST  56<H>  /  ALT  159<H>  /  AlkPhos  314<H>  03-18              RADIOLOGY & ADDITIONAL TESTS:    Imaging Personally Reviewed:  - LE doppler  < from: [facility] Duplex Lower Ext Vein Scan, Bilat (03.18.22 @ 16:10) >  RIGHT:  Normal compressibility of the RIGHT common femoral, femoral and popliteal   veins.  Doppler examination shows normal spontaneous and phasic flow.  No RIGHT calf vein thrombosis is detected.    LEFT:  Normal compressibility of the LEFT common femoral, femoral and popliteal   veins.  Doppler examination shows normal spontaneous and phasic flow.  No LEFT calf vein thrombosis is detected.    IMPRESSION:  No evidence of deep venous thrombosis in either lower extremity.    < end of copied text >      Consultant(s) Notes Reviewed:  endo    Care Discussed with Consultants/Other Providers:

## 2022-03-19 NOTE — DISCHARGE NOTE PROVIDER - NSDCMRMEDTOKEN_GEN_ALL_CORE_FT
Cipro 500 mg oral tablet: 1 tab(s) orally every 12 hours  famotidine 40 mg oral tablet: 1 tab(s) orally once a day (at bedtime)  lisinopril 10 mg oral tablet: 1 tab(s) orally once a day, As Needed ?when she gets abdominal, neck or hand pain  methIMAzole 5 mg oral tablet: 1 tab(s) orally every 8 hours  Motrin 600 mg oral tablet: 1 tab(s) orally every 6 hours, As Needed  Multiple Vitamins with Iron oral tablet: 1 tab(s) orally once a day  Vitamin D2 1.25 mg (50,000 intl units) oral capsule: 1 cap(s) orally once a week (on monday)   famotidine 40 mg oral tablet: 1 tab(s) orally once a day (at bedtime)  lisinopril 10 mg oral tablet: 1 tab(s) orally once a day, As Needed ?when she gets abdominal, neck or hand pain  methIMAzole 5 mg oral tablet: 1 tab(s) orally every 8 hours  Multiple Vitamins with Iron oral tablet: 1 tab(s) orally once a day  Vitamin D2 1.25 mg (50,000 intl units) oral capsule: 1 cap(s) orally once a week (on monday)   famotidine 40 mg oral tablet: 1 tab(s) orally once a day (at bedtime)  magnesium oxide 400 mg oral tablet: 1 tab(s) orally once a day (at bedtime)  methIMAzole 5 mg oral tablet: 1 tab(s) orally every 8 hours  Multiple Vitamins with Iron oral tablet: 1 tab(s) orally once a day  Vitamin D2 1.25 mg (50,000 intl units) oral capsule: 1 cap(s) orally once a week (on monday)

## 2022-03-19 NOTE — DISCHARGE NOTE PROVIDER - HOSPITAL COURSE
51F h/o gastric sleeve 2009, hyperthyroidism, cholecystectomy (patient unsure if she had gallstones), anxiety, anemia p/w sudden onset epigastric abdominal pain concern for biliary pathology.       Problem/Plan - 1:  ·  Problem: Epigastric abdominal pain.   ·  Plan: with transaminitis -now pain improved - ?passed stone   CT a/p - no stones or cbd dilatation seen   U/S with 8mm CBD, otherwise unremarkable  Appreciate hepatology recs - no further recommendations for MRI  LFTs improving.     Problem/Plan - 2:  ·  Problem: Hyperthyroidism.   ·  Plan: TFTs reviewed: TSH low, normal T3/T4  Endo consult for methimazole adjustment.     Problem/Plan - 3:  ·  Problem: Blurry vision.   ·  Plan: Pending MRI brain under anesthesia, scheduled for sat.     Problem/Plan - 4:  ·  Problem: Bilateral leg cramps.   ·  Plan: F/u iron panel, B12  Electrolytes wnl  Trial of Mg supplement  Duplex  negative for DVT       51F h/o gastric sleeve 2009, hyperthyroidism, cholecystectomy (patient unsure if she had gallstones), anxiety, anemia p/w sudden onset epigastric abdominal pain concern for biliary pathology.       Problem/Plan - 1:  ·  Problem: Epigastric abdominal pain.   ·  Plan: with transaminitis -now pain improved - ?passed stone   CT a/p - no stones or cbd dilatation seen   U/S with 8mm CBD, otherwise unremarkable  Appreciate hepatology recs - no further recommendations for MRI  LFTs improving.     Problem/Plan - 2:  ·  Problem: Hyperthyroidism.   ·  Plan: TFTs reviewed: TSH low, normal T3/T4  Endo consulted-  Will continue current Methimazole dose for now, will FU.  Suggest to repeat thyroid function test in 4-6 weeks. Outpatient follow up.     Problem/Plan - 3:  ·  Problem: Blurry vision.   ·  Plan: Pending MRI brain under anesthesia, scheduled for sat.     Problem/Plan - 4:  ·  Problem: Bilateral leg cramps.   ·  Plan: F/u iron panel, B12  Electrolytes wnl  Trial of Mg supplement  Duplex  negative for DVT       51F h/o gastric sleeve 2009, hyperthyroidism, cholecystectomy (patient unsure if she had gallstones), anxiety, anemia p/w sudden onset epigastric abdominal pain concern for biliary pathology.       Problem/Plan - 1:  ·  Problem: Epigastric abdominal pain.   ·  Plan: with transaminitis -now pain improved - ?passed stone   CT a/p - no stones or cbd dilatation seen   U/S with 8mm CBD, otherwise unremarkable  Appreciate hepatology recs - no further recommendations for MRI  LFTs improving.     Problem/Plan - 2:  ·  Problem: Hyperthyroidism.   ·  Plan: TFTs reviewed: TSH low, normal T3/T4  Endo consulted-  Will continue current Methimazole dose for now, will FU.  Suggest to repeat thyroid function test in 4-6 weeks. Outpatient follow up.     Problem/Plan - 3:  ·  Problem: Blurry vision.   ·  Plan: S/P MRI brain under anesthesia, no abnormal findings.   Problem/Plan - 4:  ·  Problem: Bilateral leg cramps.   ·  Plan: F/u iron panel, B12  Electrolytes wnl  Trial of Mg supplement  Duplex  negative for DVT  Cleared for discharge as per Dr Thao, medication reconciliation reviewed with Dr Thao.

## 2022-03-19 NOTE — DISCHARGE NOTE PROVIDER - CARE PROVIDER_API CALL
Chong Young)  Gastroenterology; Internal Medicine; Transplant Hepatology  [address] [place] [address]  Phone: [phone]  Fax: [phone]  Follow Up Time:    Chong Young)  Gastroenterology; Internal Medicine; Transplant Hepatology  [address] [place] [address]  Phone: [phone]  Fax: [phone]  Follow Up Time:     Birgit Rosa)  EndocrinologyMetabDiabetes; Internal Medicine  [address] [place] [address]  Phone: [phone]  Fax: [phone]  Follow Up Time:     Marina Love  Internal Medicine  [address] [place] [address]  Phone: ()-  Fax: ()-  Follow Up Time:

## 2022-03-19 NOTE — PROGRESS NOTE ADULT - PROBLEM SELECTOR PLAN 2
TFTs reviewed: TSH low, normal T3/T4  Endo consult for methimazole adjustment
TFTs reviewed: TSH low, normal T3/T4  Endo consult for methimazole adjustment

## 2022-03-19 NOTE — PROVIDER CONTACT NOTE (OTHER) - RECOMMENDATIONS
Please come to the bedside to talk to patient to calm her down regarding her procedure. Also would appreciate an order for xanex.

## 2022-03-19 NOTE — DISCHARGE NOTE PROVIDER - PROVIDER TOKENS
PROVIDER:[TOKEN:[[id number]]] PROVIDER:[TOKEN:[[id number]]],PROVIDER:[TOKEN:[[id number]]],PROVIDER:[TOKEN:[[id number]]]

## 2022-03-19 NOTE — DISCHARGE NOTE NURSING/CASE MANAGEMENT/SOCIAL WORK - NSDCPEFALRISK_GEN_ALL_CORE
For information on Fall & Injury Prevention, visit: [email or web address] OR  [email or web address] OR  https://www.cdc.gov/steadi/patient.html

## 2022-03-19 NOTE — PROVIDER CONTACT NOTE (OTHER) - BACKGROUND
51 year old female admitted with abdominal pain
51 year old female admitted for epigastric abdominal pain.

## 2022-03-19 NOTE — DISCHARGE NOTE PROVIDER - DETAILS OF MALNUTRITION DIAGNOSIS/DIAGNOSES
This patient has been assessed with a concern for Malnutrition and was treated during this hospitalization for the following Nutrition diagnosis/diagnoses:     -  03/18/2022: Severe protein-calorie malnutrition

## 2022-04-01 NOTE — CHART NOTE - NSCHARTNOTEFT_GEN_A_CORE
4/1/2022    Patient was recently admitted for management of acute epigastric abdominal pain, with elevated liver enzymes which spontaneously resolved. Extensive workup indeterminate for etiology of pain.

## 2022-09-21 NOTE — CONSULT NOTE ADULT - ASSESSMENT
"Chief Complaint   Patient presents with    Numbness     L sided face and arm numbness since 5pm today  Dizziness occurring at the same time, feels like room is spinning, endorses nausea   Strengh equal bilat, decreased sensation L face and L arm, no obvious facial droop or slurring    Chest Pain     Central chest pain starting this morning, pressure like, subsided throughout the day   Currently denies chest pain       BP (!) 181/89   Pulse (!) 58   Temp 36.1 °C (97 °F) (Temporal)   Resp 16   Ht 1.651 m (5' 5\")   Wt 94.5 kg (208 lb 5.4 oz)   LMP 10/13/2016 (Exact Date)   SpO2 99%   BMI 34.67 kg/m²     Pt made aware of triage process, placed back into lobby, educated pt to tell staff of any worsening of symptoms       "
51F with PMHx anemia, anxiety, HTN and PSHx laparoscopic sleeve gastrectomy and cholecystectomy ( with Dr. Roscoe Boggs),  presenting with 1 day of epigastric pain.    Recommendations:  - No indication for surgical interventions  - Medicine evaluation for elevated WBC and LFTs  - GI c/s   - Continue regular diet  - Pain control PRN  - Will follow    D/w Dr. Medina  Bariatric Surgery/[facility]  [id number]
52yo F w/ PMH of gastric sleeve and cholecystectomy in 2009, anxiety, anemia and recent (3 months ago) dx of hyperthyroidism on Methimazole p/w epigastric abd pain which improved after Toradol administration found to have elevated liver enzymes (hepatocellular > Cholestatic) for which hepatology was consulted.     # Assessment  - Given AST/ALT ratio w/ no EtOH consumption and c/o unintentional weight loss, night sweats, and enzymes elevation, ddx includes malignant infiltration vs infectious vs AI given hyperthyroidism hx    # Recommendations   - MRI w/ contrast of abdomen to r/o malignancy  - LDH, Uric Acid, Flow Cytometry and peripheral smear  - HBsAb, HBsAg, HBcAb, HCV Ab, HAV IgG/IgM to r/o viral hepatitides  - ABBIE, ASMA, LKM Ab, IgG subsets, quant IgM, IgA, AMA to r/o AI dz  - TTE to r/o cardiac etiology given acute onset of symptoms along w/ CPK, and cardiac enzymes  - Trend CBC, INR, CMP   - f/u US w/ doppler to evaluate biliary tree/ rule out portal vein thrombosis (Ordered)  - Avoid all hepatotoxic agents  - Will continue to follow    Case d/w Dr. Young, hepatology attending  
  Assessment  Hyperthyroidism: 51y Female with history of hyperthyroidism, admitted with abdominal pain, thyroid panel done, subclinical hyperthyroid state on Methimazole 5mg PO q8hr, she was non compliant with Methimazole intake, asymptomatic.  Abdominal pain: Being worked up, s/p procedure, on medications, monitored  HTN: On antihypertensive medications, monitored, asymptomatic.  Overweight: No strict exercise routines, neither on low calorie diet.                 Birgit Rosa MD  Cell: [phone]  Office: [phone]

## 2022-10-12 NOTE — DISCHARGE NOTE NURSING/CASE MANAGEMENT/SOCIAL WORK - NURSING SECTION COMPLETE
Patient/Caregiver provided printed discharge information. Bactrim Counseling:  I discussed with the patient the risks of sulfa antibiotics including but not limited to GI upset, allergic reaction, drug rash, diarrhea, dizziness, photosensitivity, and yeast infections.  Rarely, more serious reactions can occur including but not limited to aplastic anemia, agranulocytosis, methemoglobinemia, blood dyscrasias, liver or kidney failure, lung infiltrates or desquamative/blistering drug rashes.

## 2022-10-16 ENCOUNTER — EMERGENCY (EMERGENCY)
Facility: HOSPITAL | Age: 52
LOS: 1 days | Discharge: HOME | End: 2022-10-16
Attending: EMERGENCY MEDICINE
Payer: MEDICAID

## 2022-10-16 VITALS
TEMPERATURE: 98 F | RESPIRATION RATE: 18 BRPM | SYSTOLIC BLOOD PRESSURE: 119 MMHG | DIASTOLIC BLOOD PRESSURE: 75 MMHG | OXYGEN SATURATION: 98 % | HEART RATE: 55 BPM

## 2022-10-16 VITALS
DIASTOLIC BLOOD PRESSURE: 83 MMHG | OXYGEN SATURATION: 98 % | RESPIRATION RATE: 20 BRPM | TEMPERATURE: 98 F | HEIGHT: 64 IN | HEART RATE: 61 BPM | SYSTOLIC BLOOD PRESSURE: 143 MMHG | WEIGHT: 179.9 LBS

## 2022-10-16 DIAGNOSIS — Z90.49 ACQUIRED ABSENCE OF OTHER SPECIFIED PARTS OF DIGESTIVE TRACT: Chronic | ICD-10-CM

## 2022-10-16 DIAGNOSIS — E05.90 THYROTOXICOSIS, UNSPECIFIED WITHOUT THYROTOXIC CRISIS OR STORM: ICD-10-CM

## 2022-10-16 DIAGNOSIS — I10 ESSENTIAL (PRIMARY) HYPERTENSION: ICD-10-CM

## 2022-10-16 DIAGNOSIS — Z98.84 BARIATRIC SURGERY STATUS: Chronic | ICD-10-CM

## 2022-10-16 LAB
ALBUMIN SERPL ELPH-MCNC: 4.1 G/DL — SIGNIFICANT CHANGE UP (ref 3.3–5)
ALP SERPL-CCNC: 218 U/L — HIGH (ref 40–120)
ALT FLD-CCNC: 13 U/L — SIGNIFICANT CHANGE UP (ref 10–45)
ANION GAP SERPL CALC-SCNC: 8 MMOL/L — SIGNIFICANT CHANGE UP (ref 5–17)
AST SERPL-CCNC: 16 U/L — SIGNIFICANT CHANGE UP (ref 10–40)
BASOPHILS # BLD AUTO: 0.04 K/UL — SIGNIFICANT CHANGE UP (ref 0–0.2)
BASOPHILS NFR BLD AUTO: 0.7 % — SIGNIFICANT CHANGE UP (ref 0–2)
BILIRUB SERPL-MCNC: 0.2 MG/DL — SIGNIFICANT CHANGE UP (ref 0.2–1.2)
BUN SERPL-MCNC: 11 MG/DL — SIGNIFICANT CHANGE UP (ref 7–23)
CALCIUM SERPL-MCNC: 9.5 MG/DL — SIGNIFICANT CHANGE UP (ref 8.4–10.5)
CHLORIDE SERPL-SCNC: 105 MMOL/L — SIGNIFICANT CHANGE UP (ref 96–108)
CO2 SERPL-SCNC: 27 MMOL/L — SIGNIFICANT CHANGE UP (ref 22–31)
CREAT SERPL-MCNC: 0.72 MG/DL — SIGNIFICANT CHANGE UP (ref 0.5–1.3)
CRP SERPL-MCNC: <3 MG/L — SIGNIFICANT CHANGE UP (ref 0–4)
EGFR: 101 ML/MIN/1.73M2 — SIGNIFICANT CHANGE UP
EOSINOPHIL # BLD AUTO: 0.21 K/UL — SIGNIFICANT CHANGE UP (ref 0–0.5)
EOSINOPHIL NFR BLD AUTO: 3.5 % — SIGNIFICANT CHANGE UP (ref 0–6)
ERYTHROCYTE [SEDIMENTATION RATE] IN BLOOD: 55 MM/HR — HIGH (ref 0–20)
GLUCOSE SERPL-MCNC: 89 MG/DL — SIGNIFICANT CHANGE UP (ref 70–99)
HCG SERPL-ACNC: <2 MIU/ML — SIGNIFICANT CHANGE UP
HCT VFR BLD CALC: 39.9 % — SIGNIFICANT CHANGE UP (ref 34.5–45)
HGB BLD-MCNC: 12.1 G/DL — SIGNIFICANT CHANGE UP (ref 11.5–15.5)
IMM GRANULOCYTES NFR BLD AUTO: 0.2 % — SIGNIFICANT CHANGE UP (ref 0–0.9)
LYMPHOCYTES # BLD AUTO: 2.55 K/UL — SIGNIFICANT CHANGE UP (ref 1–3.3)
LYMPHOCYTES # BLD AUTO: 42.4 % — SIGNIFICANT CHANGE UP (ref 13–44)
MCHC RBC-ENTMCNC: 23.9 PG — LOW (ref 27–34)
MCHC RBC-ENTMCNC: 30.3 GM/DL — LOW (ref 32–36)
MCV RBC AUTO: 78.7 FL — LOW (ref 80–100)
MONOCYTES # BLD AUTO: 0.48 K/UL — SIGNIFICANT CHANGE UP (ref 0–0.9)
MONOCYTES NFR BLD AUTO: 8 % — SIGNIFICANT CHANGE UP (ref 2–14)
NEUTROPHILS # BLD AUTO: 2.73 K/UL — SIGNIFICANT CHANGE UP (ref 1.8–7.4)
NEUTROPHILS NFR BLD AUTO: 45.2 % — SIGNIFICANT CHANGE UP (ref 43–77)
NRBC # BLD: 0 /100 WBCS — SIGNIFICANT CHANGE UP (ref 0–0)
PLATELET # BLD AUTO: 442 K/UL — HIGH (ref 150–400)
POTASSIUM SERPL-MCNC: 4.5 MMOL/L — SIGNIFICANT CHANGE UP (ref 3.5–5.3)
POTASSIUM SERPL-SCNC: 4.5 MMOL/L — SIGNIFICANT CHANGE UP (ref 3.5–5.3)
PROT SERPL-MCNC: 7.4 G/DL — SIGNIFICANT CHANGE UP (ref 6–8.3)
RAPID RVP RESULT: SIGNIFICANT CHANGE UP
RBC # BLD: 5.07 M/UL — SIGNIFICANT CHANGE UP (ref 3.8–5.2)
RBC # FLD: 14.9 % — HIGH (ref 10.3–14.5)
SARS-COV-2 RNA SPEC QL NAA+PROBE: SIGNIFICANT CHANGE UP
SODIUM SERPL-SCNC: 140 MMOL/L — SIGNIFICANT CHANGE UP (ref 135–145)
T3 SERPL-MCNC: 56 NG/DL — LOW (ref 80–200)
T3FREE SERPL-MCNC: 1.65 PG/ML — LOW (ref 2–4.4)
T4 AB SER-ACNC: 3.3 UG/DL — LOW (ref 4.6–12)
T4 FREE SERPL-MCNC: 0.5 NG/DL — LOW (ref 0.9–1.8)
TROPONIN T, HIGH SENSITIVITY RESULT: <6 NG/L — SIGNIFICANT CHANGE UP (ref 0–51)
TSH SERPL-MCNC: 0.01 UIU/ML — LOW (ref 0.27–4.2)
WBC # BLD: 6.02 K/UL — SIGNIFICANT CHANGE UP (ref 3.8–10.5)
WBC # FLD AUTO: 6.02 K/UL — SIGNIFICANT CHANGE UP (ref 3.8–10.5)

## 2022-10-16 PROCEDURE — 84100 ASSAY OF PHOSPHORUS: CPT

## 2022-10-16 PROCEDURE — 80053 COMPREHEN METABOLIC PANEL: CPT

## 2022-10-16 PROCEDURE — 84443 ASSAY THYROID STIM HORMONE: CPT

## 2022-10-16 PROCEDURE — 84481 FREE ASSAY (FT-3): CPT

## 2022-10-16 PROCEDURE — 86140 C-REACTIVE PROTEIN: CPT

## 2022-10-16 PROCEDURE — 0225U NFCT DS DNA&RNA 21 SARSCOV2: CPT

## 2022-10-16 PROCEDURE — 71045 X-RAY EXAM CHEST 1 VIEW: CPT

## 2022-10-16 PROCEDURE — 71045 X-RAY EXAM CHEST 1 VIEW: CPT | Mod: 26

## 2022-10-16 PROCEDURE — 99285 EMERGENCY DEPT VISIT HI MDM: CPT | Mod: 25

## 2022-10-16 PROCEDURE — 70487 CT MAXILLOFACIAL W/DYE: CPT | Mod: MA

## 2022-10-16 PROCEDURE — 96366 THER/PROPH/DIAG IV INF ADDON: CPT

## 2022-10-16 PROCEDURE — 96365 THER/PROPH/DIAG IV INF INIT: CPT | Mod: XU

## 2022-10-16 PROCEDURE — 84480 ASSAY TRIIODOTHYRONINE (T3): CPT

## 2022-10-16 PROCEDURE — 84439 ASSAY OF FREE THYROXINE: CPT

## 2022-10-16 PROCEDURE — 84436 ASSAY OF TOTAL THYROXINE: CPT

## 2022-10-16 PROCEDURE — 83735 ASSAY OF MAGNESIUM: CPT

## 2022-10-16 PROCEDURE — 85652 RBC SED RATE AUTOMATED: CPT

## 2022-10-16 PROCEDURE — 70487 CT MAXILLOFACIAL W/DYE: CPT | Mod: 26,MA

## 2022-10-16 PROCEDURE — 83690 ASSAY OF LIPASE: CPT

## 2022-10-16 PROCEDURE — 84702 CHORIONIC GONADOTROPIN TEST: CPT

## 2022-10-16 PROCEDURE — 85025 COMPLETE CBC W/AUTO DIFF WBC: CPT

## 2022-10-16 PROCEDURE — 93005 ELECTROCARDIOGRAM TRACING: CPT

## 2022-10-16 PROCEDURE — 99285 EMERGENCY DEPT VISIT HI MDM: CPT

## 2022-10-16 PROCEDURE — 84484 ASSAY OF TROPONIN QUANT: CPT

## 2022-10-16 PROCEDURE — 96375 TX/PRO/DX INJ NEW DRUG ADDON: CPT | Mod: XU

## 2022-10-16 RX ORDER — LEVOTHYROXINE SODIUM 125 MCG
75 TABLET ORAL DAILY
Refills: 0 | Status: DISCONTINUED | OUTPATIENT
Start: 2022-10-16 | End: 2022-10-19

## 2022-10-16 RX ORDER — SODIUM CHLORIDE 9 MG/ML
1000 INJECTION INTRAMUSCULAR; INTRAVENOUS; SUBCUTANEOUS ONCE
Refills: 0 | Status: COMPLETED | OUTPATIENT
Start: 2022-10-16 | End: 2022-10-16

## 2022-10-16 RX ORDER — ACETAMINOPHEN 500 MG
1000 TABLET ORAL ONCE
Refills: 0 | Status: COMPLETED | OUTPATIENT
Start: 2022-10-16 | End: 2022-10-16

## 2022-10-16 RX ORDER — SODIUM CHLORIDE 9 MG/ML
500 INJECTION INTRAMUSCULAR; INTRAVENOUS; SUBCUTANEOUS ONCE
Refills: 0 | Status: COMPLETED | OUTPATIENT
Start: 2022-10-16 | End: 2022-10-16

## 2022-10-16 RX ORDER — METOCLOPRAMIDE HCL 10 MG
10 TABLET ORAL ONCE
Refills: 0 | Status: COMPLETED | OUTPATIENT
Start: 2022-10-16 | End: 2022-10-16

## 2022-10-16 RX ADMIN — Medication 1000 MILLIGRAM(S): at 15:49

## 2022-10-16 RX ADMIN — SODIUM CHLORIDE 1000 MILLILITER(S): 9 INJECTION INTRAMUSCULAR; INTRAVENOUS; SUBCUTANEOUS at 13:20

## 2022-10-16 RX ADMIN — SODIUM CHLORIDE 1000 MILLILITER(S): 9 INJECTION INTRAMUSCULAR; INTRAVENOUS; SUBCUTANEOUS at 15:49

## 2022-10-16 RX ADMIN — SODIUM CHLORIDE 500 MILLILITER(S): 9 INJECTION INTRAMUSCULAR; INTRAVENOUS; SUBCUTANEOUS at 19:50

## 2022-10-16 RX ADMIN — Medication 400 MILLIGRAM(S): at 13:20

## 2022-10-16 RX ADMIN — Medication 10 MILLIGRAM(S): at 19:50

## 2022-10-16 NOTE — CONSULT NOTE ADULT - SUBJECTIVE AND OBJECTIVE BOX
HPI:  Patient has history of Grave's disease, she is s/p thyroid ablation, has neck pain, gaining weight.      PAST MEDICAL & SURGICAL HISTORY:  Migraines      Anemia      Anxiety      Migraines      Hypertension      Gastric banding status      S/P cholecystectomy      History of cholecystectomy          FAMILY HISTORY:  FH: lung cancer (Father)    Family history of pancreatic cancer (Father)    FH: type 2 diabetes (Mother)        Social History:    Outpatient Medications:    MEDICATIONS  (STANDING):  levothyroxine 75 MICROGram(s) Oral daily    MEDICATIONS  (PRN):      Allergies    Augmentin (Unknown)  morphine (Short breath)  morphine (Stomach Upset)  oxycodone (Rash)    Intolerances      Review of Systems:  Constitutional: No fever, no chills  Eyes: No blurry vision  Neuro: No tremors  HEENT: No pain, no neck swelling  Cardiovascular: No chest pain, no palpitations  Respiratory: No SOB, no cough  GI: No nausea, vomiting, abdominal pain  : No dysuria  Skin: no rash  MSK: Has leg swelling.  Psych: no depression  Endocrine: no polyuria, polydipsia    UNABLE TO OBTAIN    ALL OTHER SYSTEMS REVIEWED AND NEGATIVE        PHYSICAL EXAM:  VITALS: T(C): 36.9 (10-16-22 @ 19:56)  T(F): 98.4 (10-16-22 @ 19:56), Max: 98.4 (10-16-22 @ 19:56)  HR: 55 (10-16-22 @ 19:56) (55 - 70)  BP: 119/75 (10-16-22 @ 19:56) (119/75 - 146/66)  RR:  (18 - 20)  SpO2:  (98% - 98%)  Wt(kg): --  GENERAL: NAD, well-groomed, well-developed  EYES: No proptosis, no lid lag  HEENT:  Atraumatic, Normocephalic  THYROID: Normal size, no palpable nodules  RESPIRATORY: Clear to auscultation bilaterally; No rales, rhonchi, wheezing  CARDIOVASCULAR: Si S2, No murmurs;  GI: Soft, non distended, normal bowel sounds  SKIN: Dry, intact, No rashes or lesions  MUSCULOSKELETAL: Has BL lower extremity edema.  NEURO:  no tremor, sensation decreased in feet BL,                              12.1   6.02  )-----------( 442      ( 16 Oct 2022 13:31 )             39.9       10-16    140  |  105  |  11  ----------------------------<  89  4.5   |  27  |  0.72    eGFR: 101    Ca    9.5      10-16  Mg     2.2     10-16  Phos  3.7     10-16    TPro  7.4  /  Alb  4.1  /  TBili  0.2  /  DBili  x   /  AST  16  /  ALT  13  /  AlkPhos  218<H>  10-16      Thyroid Function Tests:  10-16 @ 13:32 TSH 0.01 FreeT4 0.5 T3 56 Anti TPO -- Anti Thyroglobulin Ab -- TSI --              Radiology:

## 2022-10-16 NOTE — ED ADULT NURSE NOTE - OBJECTIVE STATEMENT
PT is a 52 year old A&OX3 female with PMH of Grave's Disease and anxiety who presents to the ED from home with c/o right-sided eye, neck, and chest pain. PT states that this pain began yesterday morning and that she took Motrin for it without relief. PT denies numbness/tingling, SOB, dizziness, N/V/D, and fevers at home. PT also endorsing headache. PT is sitting up in bed, breathing unlabored on room air, and speaking in complete sentences. Face appears symmetrical, no droop noted. Abdomen is soft, non-tender, and non-distended. Skin is warm and dry, no diaphoresis noted. No edema noted to B/L extremities. Strong strength in B/L extremities, sensation intact. PT ambulatory with steady gait. Safety and comfort maintained. Family at the bedside.

## 2022-10-16 NOTE — ED PROVIDER NOTE - OBJECTIVE STATEMENT
53yo female pt with PMHx of Gastric Sleeve 2009, Hyperthyroidism, Cholecystectomy, Anxiety, Anemia presents to ED with right facial/neck pain and swelling and mid chest pain since yesterday. 51yo female pt with PMHx of Gastric Sleeve 2009, Hyperthyroidism, Cholecystectomy, Anxiety, Anemia presents to ED with right facial/neck pain and swelling and mid chest pain since yesterday. Reports 53yo female pt with PMHx of Gastric Sleeve 2009, Hyperthyroidism (on f/u with endo Dr. Birgit Rosa), Cholecystectomy, Anxiety, Anemia presents to ED with right facial/neck pain and swelling and mid chest pain since yesterday. Reports she had stop taking Topazole 5mg due to side effects (feeling sick and dizzy) and had radiation Tx twice (2nd one was 8/24/2022).  She's not f/u with Dr. Rosa since radiation Tx and scheduled appointment on 11/2022. She noticed right 51yo female pt with PMHx of Gastric Sleeve 2009, Hyperthyroidism (on f/u with endo Dr. Birgit Rosa), Cholecystectomy, Anxiety, Anemia presents to ED with right facial/neck pain and swelling and mid chest pain since yesterday. Reports she had stop taking Topazole 5mg due to side effects (feeling sick and dizzy) and had radiation Tx twice (2nd one was 8/24/2022).  She's not f/u with Dr. Rosa since radiation Tx and scheduled appointment on 11/2022. She noticed right sided headache, eye  pressure, facial/neck pain with swelling and mid chest pain yesterday. Denies fever, chills, cough or sore throat. Denies dizziness or palpitation. Denies SOB. Denies ABD pain or N/V/D. Denies sensory changes or weakness to extremities. Denies urinary problems. 51yo female pt with PMHx of Gastric Sleeve 2009, Hyperthyroidism (on f/u with endo Dr. Birgit Rosa), Cholecystectomy, Anxiety, Anemia presents to ED with right facial/neck pain and swelling and mid chest pain since yesterday. Reports she had stop taking Topazole 5mg due to side effects (feeling sick and dizzy) and had radiation Tx twice (2nd one was 8/24/2022).  She's not f/u with Dr. Rosa since radiation Tx and scheduled appointment on 11/2022. She noticed right sided headache, eye  pressure, facial/neck pain with swelling and mid chest pain yesterday. She also has 20lbs wt gain in one month. Denies fever, chills, cough or sore throat. Denies dizziness or palpitation. Denies SOB. Denies ABD pain or N/V/D. Denies sensory changes or weakness to extremities. Denies urinary problems.

## 2022-10-16 NOTE — ED PROVIDER NOTE - PROGRESS NOTE DETAILS
Dr. Rosa will see pt in ED. NAD. Awaiting for Dr. Rosa. Dr. Rosa at bedside. Dr. Rosa recommended; no urgent endo intervention required at this time, pending TFT and will see pt in his office tomorrow. Pt states feeling better now but still has eye pressure, borderline IOP, and will call opthal for consult. Ophthal at bedside. Ophthal recommendation; no urgent intervention required at this time, will f/u TFT and patient will follow-up with ophthalmology this week. NAD. Awaiting for CT reading.

## 2022-10-16 NOTE — CONSULT NOTE ADULT - PROBLEM SELECTOR RECOMMENDATION 9
Will start her on Synthroid 75 mcg po daily.  Suggest to repeat thyroid function test in 4-6 weeks. Outpatient follow up.

## 2022-10-16 NOTE — ED PROVIDER NOTE - ATTENDING CONTRIBUTION TO CARE
RGUJRAL 53yo f hx listed presents with 1-2 weeks of neck swelling and facial swelling. States she has noticed some LN on both sides of her neck. Pt was on methimazole previously and has been trying to obtain follow up with her endocrinologist. Complains of mild HA, eye pressure, no change in vision and upper chest and back pain. No cough, uri, f/c.  On exam, Patient is awake,alert,oriented x 3. Patient is well appearing and in no acute distress. PERRL, EOMI, Neck supple, mild b/l shotty LAD. Patient's chest is clear to ausculation, +s1s2. Abdomen is soft nd/nt +BS. Extremity with no swelling or calf tenderness.   Pt well appearing, check labs, xray chest.   Spoke to patient's Endocrinologist, will see patient in the ED.

## 2022-10-16 NOTE — ED PROVIDER NOTE - NS ED ATTENDING STATEMENT MOD
Attending with (4) rarely moist This was a shared visit with the [facility]. I reviewed and verified the documentation and independently performed the documented:

## 2022-10-16 NOTE — CONSULT NOTE ADULT - SUBJECTIVE AND OBJECTIVE BOX
[facility] DEPARTMENT OF OPHTHALMOLOGY - INITIAL ADULT CONSULT  -----------------------------------------------------------------------------------------------------------------  Chao Simental MD PGY 2  Contact via [employer] software Teams  -----------------------------------------------------------------------------------------------------------------    HPI: Per ED  51yo female pt with PMHx of Gastric Sleeve 2009, Hyperthyroidism (on f/u with endo Dr. Birgit Rosa), Cholecystectomy, Anxiety, Anemia presents to ED with right facial/neck pain and swelling and mid chest pain since yesterday. Reports she had stop taking Topazole 5mg due to side effects (feeling sick and dizzy) and had radiation Tx twice (2nd one was 8/24/2022).  She's not f/u with Dr. Rosa since radiation Tx and scheduled appointment on 11/2022. She noticed right sided headache, eye  pressure, facial/neck pain with swelling and mid chest pain yesterday. She also has 20lbs wt gain in one month. Denies fever, chills, cough or sore throat. Denies dizziness or palpitation. Denies SOB. Denies ABD pain or N/V/D. Denies sensory changes or weakness to extremities. Denies urinary problems.    Interval History: At bedside pt is overall comfortable appearing. Endorses discomfort with movement of the eyes. Endorses upper eyelid swelling. Endorses mild headache. All sx onset was 2 days ago. Denies any visual changes. Denies double vision. Denies flashes/floaters.curtains. Currently not taking any thyroid medicines. Last seen by endo in August. Endorses 30lb weight gain in last month. Denies whoosing sensation in ears. Denies visual changes with change in position.     PAST MEDICAL & SURGICAL HISTORY:  Migraines      Anemia      Anxiety      Migraines      Hypertension      Gastric banding status      S/P cholecystectomy      History of cholecystectomy        Past Ocular History: None    Family History:  FAMILY HISTORY:  FH: lung cancer (Father)    Family history of pancreatic cancer (Father)    FH: type 2 diabetes (Mother)      Ophthalmic Medications: None    MEDICATIONS  (STANDING):    MEDICATIONS  (PRN):    Allergies & Intolerances:   Augmentin (Unknown)  morphine (Short breath)  morphine (Stomach Upset)  oxycodone (Rash)    Review of Systems:  Constitutional: No fever, chills  Eyes: No blurry vision, flashes, floaters, FBS, erythema, discharge, double vision, OU  Neuro: No tremors  Cardiovascular: No chest pain, palpitations  Respiratory: No SOB, no cough  GI: No nausea, vomiting, abdominal pain  : No dysuria  Skin: no rash  Psych: no depression  Endocrine: no polyuria, polydipsia  Heme/lymph: no swelling    VITALS: T(C): 36.7 (10-16-22 @ 15:54)  T(F): 98 (10-16-22 @ 15:54), Max: 98.1 (10-16-22 @ 10:55)  HR: 70 (10-16-22 @ 15:54) (61 - 70)  BP: 146/66 (10-16-22 @ 15:54) (143/83 - 146/66)  RR:  (20 - 20)  SpO2:  (98% - 98%)  Wt(kg): --  General: AAO x 3, appropriate mood and affect    Ophthalmology Exam:  Visual acuity (sc): 20/20 -2 OU.  Pupils: PERRL OU, no APD  Tonometry:  19 OD, 21 OS  Extraocular movements (EOMs): Full OU, no diplopia. Endorses mild discomfort with lateral eye movements. Negative Cogan's eyelid twitch.   Confrontational Visual Field (CVF): Full OU.  Color Plates: 12/12 OU.    Pen Light Exam (PLE)  External: Normal OU.  Lids/Lashes/Lacrimal Ducts: Trace, soft upper eyelid edema. Flat OU.  Sclera/Conjunctiva: White and quiet OU.  Cornea: Trace SPK OU.  Anterior Chamber: Deep and formed OU.    Iris: Flat OU.  Lens: Clear OU.    Fundus Exam: dilated with 1% tropicamide and 2.5% phenylephrine  Approval obtained from primary team for dilation  Patient aware that pupils can remained dilated for at least 4-6 hours.  Exam performed with 20 D lens    Vitreous: wnl OU  Disc, cup/disc: sharp and pink, 0.2 OU. OS with vertical elongation of the disc. No obscuration of vessels OU.   Macula: wnl OU  Vessels: wnl OU  Periphery: wnl OU    Labs/Imaging:  < from: MR Head w/wo IV Cont (03.19.22 @ 10:46) >  he lateral ventricles have a normal configuration    There is no evidence acute hemorrhage mass or mass effect seen.    Increased signal is associated with bilateral sulci throughout both   cerebral hemispheres. This evidence seen on the axial T2 FLAIR sequence   and likely secondary to propofol given patient's history of sedation.   Please correlate clinically. If propofol was not given than this can be   seen with subarachnoid hemorrhage and meningitis. Please correlate   clinically.    Evaluation of the diffusion weighted sequence demonstrates no abnormal   areas of restricted diffusion to suggest acute infarct.    No abnormal areas of enhancement is seen.    There are no abnormal intra or extra-axial collections seen    The large vessels demonstrate normal flow voids.    Visualized paranasal sinuses mastoid and middle ear is clear.    IMPRESSION: Abnormal increased signal is associated with the sulci   throughout both cerebral hemispheres on the axial T2 FLAIR sequence is   identified.    --- End of Report ---    < end of copied text >     [facility] DEPARTMENT OF OPHTHALMOLOGY - INITIAL ADULT CONSULT  -----------------------------------------------------------------------------------------------------------------  Chao Simental MD PGY 2  Contact via [employer] Teams  -----------------------------------------------------------------------------------------------------------------    HPI: Per ED  53yo female pt with PMHx of Gastric Sleeve 2009, Hyperthyroidism (on f/u with endo Dr. Birgit Rosa), Cholecystectomy, Anxiety, Anemia presents to ED with right facial/neck pain and swelling and mid chest pain since yesterday. Reports she had stop taking Topazole 5mg due to side effects (feeling sick and dizzy) and had radiation Tx twice (2nd one was 8/24/2022).  She's not f/u with Dr. Rosa since radiation Tx and scheduled appointment on 11/2022. She noticed right sided headache, eye  pressure, facial/neck pain with swelling and mid chest pain yesterday. She also has 20lbs wt gain in one month. Denies fever, chills, cough or sore throat. Denies dizziness or palpitation. Denies SOB. Denies ABD pain or N/V/D. Denies sensory changes or weakness to extremities. Denies urinary problems.    Interval History: At bedside pt is overall comfortable appearing. Endorses discomfort with movement of the eyes. Endorses upper eyelid swelling. Endorses mild headache. All sx onset was 2 days ago. Denies any visual changes. Denies double vision. Denies flashes/floaters.curtains. Currently not taking any thyroid medicines. Last seen by endo in August. Endorses 30lb weight gain in last month. Denies whoosing sensation in ears. Denies visual changes with change in position.     PAST MEDICAL & SURGICAL HISTORY:  Migraines      Anemia      Anxiety      Migraines      Hypertension      Gastric banding status      S/P cholecystectomy      History of cholecystectomy        Past Ocular History: None    Family History:  FAMILY HISTORY:  FH: lung cancer (Father)    Family history of pancreatic cancer (Father)    FH: type 2 diabetes (Mother)      Ophthalmic Medications: None    MEDICATIONS  (STANDING):    MEDICATIONS  (PRN):    Allergies & Intolerances:   Augmentin (Unknown)  morphine (Short breath)  morphine (Stomach Upset)  oxycodone (Rash)    Review of Systems:  Constitutional: No fever, chills  Eyes: No blurry vision, flashes, floaters, FBS, erythema, discharge, double vision, OU  Neuro: No tremors  Cardiovascular: No chest pain, palpitations  Respiratory: No SOB, no cough  GI: No nausea, vomiting, abdominal pain  : No dysuria  Skin: no rash  Psych: no depression  Endocrine: no polyuria, polydipsia  Heme/lymph: no swelling    VITALS: T(C): 36.7 (10-16-22 @ 15:54)  T(F): 98 (10-16-22 @ 15:54), Max: 98.1 (10-16-22 @ 10:55)  HR: 70 (10-16-22 @ 15:54) (61 - 70)  BP: 146/66 (10-16-22 @ 15:54) (143/83 - 146/66)  RR:  (20 - 20)  SpO2:  (98% - 98%)  Wt(kg): --  General: AAO x 3, appropriate mood and affect    Ophthalmology Exam:  Visual acuity (sc): 20/20 -2 OU.  Pupils: PERRL OU, no APD  Tonometry:  19 OD, 21 OS  Extraocular movements (EOMs): Full OU, no diplopia. Endorses mild discomfort with lateral eye movements. Negative Cogan's eyelid twitch.   Confrontational Visual Field (CVF): Full OU.  Color Plates: 12/12 OU.    Pen Light Exam (PLE)  External: Normal OU.  Lids/Lashes/Lacrimal Ducts: Trace, soft upper eyelid edema. Flat OU.  Sclera/Conjunctiva: White and quiet OU.  Cornea: Trace SPK OU.  Anterior Chamber: Deep and formed OU.    Iris: Flat OU.  Lens: Clear OU.    Fundus Exam: dilated with 1% tropicamide and 2.5% phenylephrine  Approval obtained from primary team for dilation  Patient aware that pupils can remained dilated for at least 4-6 hours.  Exam performed with 20 D lens    Vitreous: wnl OU  Disc, cup/disc: sharp and pink, 0.2 OU. OS with vertical elongation of the disc. No obscuration of vessels OU.   Macula: wnl OU  Vessels: wnl OU  Periphery: wnl OU    Labs/Imaging:  < from: CT Maxillofacial w/ IV Cont (10.16.22 @ 18:52) >  IMPRESSION:  Mildly enlarged medial and inferior rectus muscles. There is dirty fat   and inflammatory type change associated with the inferior rectus muscles   as they insert on the globe and slightly enlarged lacrimal glands raising   the possibility of orbital pseudotumor. No evidence of orbital cellulitis.    --- End of Report ---    < end of copied text >      < from: MR Head w/wo IV Cont (03.19.22 @ 10:46) >  he lateral ventricles have a normal configuration    There is no evidence acute hemorrhage mass or mass effect seen.    Increased signal is associated with bilateral sulci throughout both   cerebral hemispheres. This evidence seen on the axial T2 FLAIR sequence   and likely secondary to propofol given patient's history of sedation.   Please correlate clinically. If propofol was not given than this can be   seen with subarachnoid hemorrhage and meningitis. Please correlate   clinically.    Evaluation of the diffusion weighted sequence demonstrates no abnormal   areas of restricted diffusion to suggest acute infarct.    No abnormal areas of enhancement is seen.    There are no abnormal intra or extra-axial collections seen    The large vessels demonstrate normal flow voids.    Visualized paranasal sinuses mastoid and middle ear is clear.    IMPRESSION: Abnormal increased signal is associated with the sulci   throughout both cerebral hemispheres on the axial T2 FLAIR sequence is   identified.    --- End of Report ---    < end of copied text >

## 2022-10-16 NOTE — ED PROVIDER NOTE - ATTENDING APP SHARED VISIT CONTRIBUTION OF CARE
RGUJRAL 51yo f hx listed presents with 1-2 weeks of neck swelling and facial swelling. States she has noticed some LN on both sides of her neck. Pt was on methimazole previously and has been trying to obtain follow up with her endocrinologist. Complains of mild HA, eye pressure, no change in vision and upper chest and back pain. No cough, uri, f/c.  On exam, Patient is awake,alert,oriented x 3. Patient is well appearing and in no acute distress. PERRL, EOMI, Neck supple, mild b/l shotty LAD. Patient's chest is clear to ausculation, +s1s2. Abdomen is soft nd/nt +BS. Extremity with no swelling or calf tenderness.   Pt well appearing, check labs, xray chest.   Spoke to patient's Endocrinologist, will see patient in the ED.

## 2022-10-16 NOTE — CONSULT NOTE ADULT - ASSESSMENT
Assessment  DMT2: 52y Female with history of Grave.s disease, s/p thyroid ablation, not on any thyroid supplements, subclinical hyperthyroidism now ( TSH is lagging behind with low Free T4), has neck/eye pain, has gained weight being worked up.              Birgit Rosa MD  Cell:  [phone]  Office: [phone]

## 2022-10-16 NOTE — CONSULT NOTE ADULT - ASSESSMENT
Assessment and Recommendations:  52y female with a past medical history of migraines, Gastric Sleeve 2009, Hyperthyroidism (on f/u with endo Dr. Birgit Rosa), Cholecystectomy, Anxiety, Anemia, no ocular history, consulted for evaluation of pressure sensation behind eyes and discomfort with eye movements.     Visual Acuity 20/20 -2 OU. PERRL no APD. Intraocular pressure 19 OD, 21 OS. EOM full. CVF full. Color plates 12/12 OU. Anterior exam with trace spk OU. Dilated fundus exam within normal limits.     #Discomfort with EOM  - Pt describes discomfort with EOM and pressure sensation behind the eyes. Denies double vision.   - Given history of thyroid disease and patient not currently being on thyroid medications, thyroid eye disease (ТАТЬЯНА) is high on the differential  - Other etiologies of pain with EOM include orbital myositis, orbital pseudotumor, and/or other orbital mass effect. Infectious causes are unlikely given lack of preceding systemic illness.   - Recommend CT Orbits w/w/o contrast to evaluate for orbital inflammatory phenomenon  - Please draw thyroid function laboratories. Patient should follow-up with endocrinology.   - Patient will follow-up with ophthalmology this week    #Dry Eye Syndrome  - Recommend AT 4x/day     DW Dr. Emmanuel Yanez MD PGY4    Outpatient Follow-up: Patient should follow-up with his/her ophthalmologist or with [facility] Department of Ophthalmology within 1 week of after discharge at:    [address]  [place] [address]  [phone]    Chao Simental MD PGY 2  Available on [employer] Teams Assessment and Recommendations:  52y female with a past medical history of migraines, Gastric Sleeve 2009, Hyperthyroidism (on f/u with endo Dr. Birgit Rosa), Cholecystectomy, Anxiety, Anemia, no ocular history, consulted for evaluation of pressure sensation behind eyes and discomfort with eye movements.     Visual Acuity 20/20 -2 OU. PERRL no APD. Intraocular pressure 19 OD, 21 OS. EOM full. CVF full. Color plates 12/12 OU. Anterior exam with trace spk OU. Dilated fundus exam within normal limits.     #Discomfort with EOM  - Pt describes discomfort with EOM and pressure sensation behind the eyes. Denies double vision.   - Given history of thyroid disease and patient not currently being on thyroid medications, thyroid eye disease (ТАТЬЯНА) is high on the differential  - Other etiologies of pain with EOM include orbital myositis, orbital pseudotumor, and/or other orbital mass effect. Infectious causes are unlikely given lack of preceding systemic illness.   - CT max face results noted: Thyroid Ophthalmopathy versus Orbital Pseudotumor  - Please draw thyroid function laboratories. Patient should follow-up with endocrinology.   - Patient will follow-up with ophthalmology this week    #Dry Eye Syndrome  - Recommend AT 4x/day     DW Dr. Emmanuel Yanez MD PGY4    Outpatient Follow-up: Patient should follow-up with his/her ophthalmologist or with [facility] Department of Ophthalmology within 1 week of after discharge at:    [address]  [place] [address]  [phone]    Chao Simental MD PGY 2  Available on [employer] Teams

## 2022-10-16 NOTE — ED PROVIDER NOTE - CARE PROVIDER_API CALL
Birgit Rosa)  EndocrinologyMetabDiabetes; Internal Medicine  [address] [place] [address]  Phone: [phone]  Fax: [phone]  Follow Up Time:

## 2022-10-16 NOTE — ED PROVIDER NOTE - PATIENT PORTAL LINK FT
You can access the FollowMyHealth Patient Portal offered by [facility] by registering at the following website: [email or web address]. By joining [facility]’s FollowMyHealth portal, you will also be able to view your health information using other applications (apps) compatible with our system.

## 2022-10-16 NOTE — ED PROVIDER NOTE - NSFOLLOWUPCLINICS_GEN_ALL_ED_FT
[facility]  Ophthalmology  [address] [place] [address], [facility] [address]  [place] [address]  Phone: [phone]  Fax:

## 2022-10-16 NOTE — ED PROVIDER NOTE - NSFOLLOWUPINSTRUCTIONS_ED_ALL_ED_FT
Please see the information of Hyperthyroidism.    Keep continue your current medications as prescribed.    Preservative artificial tear four times a day for dry eye.    Follow up with your endocrinologist Dr. Rosa tomorrow, call in AM for the same day appointment.    Follow up with eye clinic this week, call tomorrow for appointment.   [address]  [place] [address]  [phone]    Follow up with your primary Dr. as scheduled.     Return for any concerns, fever, numbness, weakness, vomiting, swallowing problems, or worsening symptoms.

## 2023-02-22 NOTE — ED ADULT NURSE NOTE - ISOLATION PROVIDED EDUCATION
Chart review note    Nonbillable. MRI reviewed. MRI Results (most recent):  Results from [place] encounter on 02/15/23    MRI ABD WO CONT    Narrative  EXAM:  MRI ABD WO CONT    INDICATION: Liver failure    COMPARISON: CT performed on 2/15/2023    TECHNIQUE: Coronal T2 and noncontrast T1; Axial T2, in/out of phase, MRCP,  diffusion-weighted imaging MRI of the abdomen . FINDINGS: Liver: Diffuse low T2 signal throughout the liver with loss of signal  on in phase imaging compatible with iron deposition. No focal liver lesions. Biliary tree: Gallbladder is contracted. Common bile duct measures 6 mm. No  focal lesions are identified. Pancreas: Unremarkable    Spleen: Diffuse T2 hypointensity in the spleen    Adrenal glands: Unremarkable    Kidneys: Unremarkable . Small amount of perinephric fluid bilaterally. Vasculature: Limited evaluation is unremarkable. Bowel: Unremarkable    Lymph nodes: No lymphadenopathy is noted    Miscellaneous: None    Impression  1. Findings consistent with diffuse iron deposition in the liver and spleen. 2.  No acute pathology. Unclear cause for iron deposition in the liver and spleen. Would recommend discussing with GI. The patient will likely require liver biopsy to investigate cause of liver failure.           Rere Evangelista MD    Attending [address] [place] [address] Patient/Family

## 2023-03-06 NOTE — ED PROVIDER NOTE - OBJECTIVE STATEMENT
Yes 51 y/o F with mother at bedside.  c/o pain in chest and abdomen since Wednesday.  Pain in chest is throughout both sides of chest, constantly present since Wednesday.  Took train ride from [place] to [place] on Wed and symptoms started prior to trip.  No known trauma.  No shortness of breath or difficulty breathing.  Also pain throughout abdomen constantly present since Wednesday.  Sharp, not crampy.  Denies any nausea but multiple episodes of vomiting from Wed through Thursday.  2 episodes vomiting yesterday, none today.  Reports having normal formed bowel movements until today when she had 3 loose BMs.  No blood in stool or vomit.  No urinary complaints.  Also c/o soreness in b/l neck. Mild headache but not severe as her migraines per patient.  No fever.  Mild cough at times.  Previous hx covid.  Reports taking pepcid daily for GERD as prescribed.  Prescribed xanax for anxiety and lisinopril to take for headaches, but has not required either of these x 1 month.  Noted hx gastric sleeve in , cholecystecomy and  in past.  Previous [facility] records reviewed.  PMD Dr. Silveira. LMP currently having.

## 2023-06-27 NOTE — ED PROVIDER NOTE - PSYCHIATRIC [+], MLM
Cayetano Farris, the diabetes test that we added on to the blood you did last week came back unchanged from last time. That means still in the prediabetes range. All the things we talked about in the visit is what we can do to make sure it does not progress. The diet and exercise part!!     Dr. Manriquez    
Discussed with pt in clinic
ANXIETY

## 2024-06-14 NOTE — ED PROVIDER NOTE - AXIS
Pt. Presents to the ED with complaints of SOB, cough with mucous production, and generalized weakness. Pt. With Hx of ICD for HF, CKD, multiple myeloma. Pt. Drowsy and weak upon triage.   
Normal

## 2024-08-19 NOTE — ED PROVIDER NOTE - PMH
Anemia    Anxiety    Migraines
PAST SURGICAL HISTORY:  Basal Cell Carcinoma of Face 2007    History of Biopsy Liver 1995; 2008    History of Biopsy Kidney 1988    History of Cholecystectomy     Kidney Transplant     Perianal Abscess s/p Sphincterectomy  s/p abscess drainage 10/26/15    S/P kidney transplant     Status Post Unilateral Hernia Repair     Umbilical Hernia (ICD9 553.1)

## 2024-09-06 NOTE — ED PROVIDER NOTE - CARE PLAN
Addended by: SHEILA DENNIS on: 9/6/2024 12:10 PM     Modules accepted: Orders    
Principal Discharge DX:	Diverticulitis

## 2025-06-25 ENCOUNTER — INPATIENT (INPATIENT)
Facility: HOSPITAL | Age: 55
LOS: 1 days | Discharge: HOME | DRG: 379 | End: 2025-06-27
Attending: SURGERY | Admitting: SURGERY
Payer: COMMERCIAL

## 2025-06-25 VITALS
RESPIRATION RATE: 20 BRPM | SYSTOLIC BLOOD PRESSURE: 182 MMHG | OXYGEN SATURATION: 98 % | HEIGHT: 66 IN | DIASTOLIC BLOOD PRESSURE: 95 MMHG | WEIGHT: 231.04 LBS | HEART RATE: 77 BPM | TEMPERATURE: 98 F

## 2025-06-25 DIAGNOSIS — Z98.84 BARIATRIC SURGERY STATUS: Chronic | ICD-10-CM

## 2025-06-25 DIAGNOSIS — Z90.49 ACQUIRED ABSENCE OF OTHER SPECIFIED PARTS OF DIGESTIVE TRACT: Chronic | ICD-10-CM

## 2025-06-25 LAB
ALBUMIN SERPL ELPH-MCNC: 4 G/DL — SIGNIFICANT CHANGE UP (ref 3.3–5)
ALP SERPL-CCNC: 112 U/L — SIGNIFICANT CHANGE UP (ref 40–120)
ALT FLD-CCNC: 8 U/L — LOW (ref 10–45)
ANION GAP SERPL CALC-SCNC: 11 MMOL/L — SIGNIFICANT CHANGE UP (ref 5–17)
APTT BLD: 33.5 SEC — SIGNIFICANT CHANGE UP (ref 26.1–36.8)
AST SERPL-CCNC: 11 U/L — SIGNIFICANT CHANGE UP (ref 10–40)
BASOPHILS # BLD AUTO: 0.05 K/UL — SIGNIFICANT CHANGE UP (ref 0–0.2)
BASOPHILS NFR BLD AUTO: 0.5 % — SIGNIFICANT CHANGE UP (ref 0–2)
BILIRUB SERPL-MCNC: 0.2 MG/DL — SIGNIFICANT CHANGE UP (ref 0.2–1.2)
BLD GP AB SCN SERPL QL: NEGATIVE — SIGNIFICANT CHANGE UP
BUN SERPL-MCNC: 9 MG/DL — SIGNIFICANT CHANGE UP (ref 7–23)
CALCIUM SERPL-MCNC: 9.4 MG/DL — SIGNIFICANT CHANGE UP (ref 8.4–10.5)
CHLORIDE SERPL-SCNC: 106 MMOL/L — SIGNIFICANT CHANGE UP (ref 96–108)
CO2 SERPL-SCNC: 25 MMOL/L — SIGNIFICANT CHANGE UP (ref 22–31)
CREAT SERPL-MCNC: 0.83 MG/DL — SIGNIFICANT CHANGE UP (ref 0.5–1.3)
EGFR: 84 ML/MIN/1.73M2 — SIGNIFICANT CHANGE UP
EGFR: 84 ML/MIN/1.73M2 — SIGNIFICANT CHANGE UP
EOSINOPHIL # BLD AUTO: 0.09 K/UL — SIGNIFICANT CHANGE UP (ref 0–0.5)
EOSINOPHIL NFR BLD AUTO: 0.9 % — SIGNIFICANT CHANGE UP (ref 0–6)
FLUAV AG NPH QL: SIGNIFICANT CHANGE UP
FLUBV AG NPH QL: SIGNIFICANT CHANGE UP
GAS PNL BLDV: SIGNIFICANT CHANGE UP
GLUCOSE SERPL-MCNC: 104 MG/DL — HIGH (ref 70–99)
HCT VFR BLD CALC: 35.1 % — SIGNIFICANT CHANGE UP (ref 34.5–45)
HGB BLD-MCNC: 10.8 G/DL — LOW (ref 11.5–15.5)
IMM GRANULOCYTES # BLD AUTO: 0.03 K/UL — SIGNIFICANT CHANGE UP (ref 0–0.07)
IMM GRANULOCYTES NFR BLD AUTO: 0.3 % — SIGNIFICANT CHANGE UP (ref 0–0.9)
INR BLD: 0.96 RATIO — SIGNIFICANT CHANGE UP (ref 0.85–1.16)
LIDOCAIN IGE QN: 19 U/L — SIGNIFICANT CHANGE UP (ref 7–60)
LYMPHOCYTES # BLD AUTO: 2.68 K/UL — SIGNIFICANT CHANGE UP (ref 1–3.3)
LYMPHOCYTES NFR BLD AUTO: 25.4 % — SIGNIFICANT CHANGE UP (ref 13–44)
MCHC RBC-ENTMCNC: 25.7 PG — LOW (ref 27–34)
MCHC RBC-ENTMCNC: 30.8 G/DL — LOW (ref 32–36)
MCV RBC AUTO: 83.4 FL — SIGNIFICANT CHANGE UP (ref 80–100)
MONOCYTES # BLD AUTO: 0.79 K/UL — SIGNIFICANT CHANGE UP (ref 0–0.9)
MONOCYTES NFR BLD AUTO: 7.5 % — SIGNIFICANT CHANGE UP (ref 2–14)
NEUTROPHILS # BLD AUTO: 6.92 K/UL — SIGNIFICANT CHANGE UP (ref 1.8–7.4)
NEUTROPHILS NFR BLD AUTO: 65.4 % — SIGNIFICANT CHANGE UP (ref 43–77)
NRBC # BLD AUTO: 0 K/UL — SIGNIFICANT CHANGE UP (ref 0–0)
NRBC # FLD: 0 K/UL — SIGNIFICANT CHANGE UP (ref 0–0)
NRBC BLD AUTO-RTO: 0 /100 WBCS — SIGNIFICANT CHANGE UP (ref 0–0)
PLATELET # BLD AUTO: 402 K/UL — HIGH (ref 150–400)
PMV BLD: 10.6 FL — SIGNIFICANT CHANGE UP (ref 7–13)
POTASSIUM SERPL-MCNC: 4.1 MMOL/L — SIGNIFICANT CHANGE UP (ref 3.5–5.3)
POTASSIUM SERPL-SCNC: 4.1 MMOL/L — SIGNIFICANT CHANGE UP (ref 3.5–5.3)
PROT SERPL-MCNC: 7.4 G/DL — SIGNIFICANT CHANGE UP (ref 6–8.3)
PROTHROM AB SERPL-ACNC: 11.1 SEC — SIGNIFICANT CHANGE UP (ref 9.9–13.4)
RBC # BLD: 4.21 M/UL — SIGNIFICANT CHANGE UP (ref 3.8–5.2)
RBC # FLD: 14.8 % — HIGH (ref 10.3–14.5)
RH IG SCN BLD-IMP: POSITIVE — SIGNIFICANT CHANGE UP
RSV RNA NPH QL NAA+NON-PROBE: SIGNIFICANT CHANGE UP
SARS-COV-2 RNA SPEC QL NAA+PROBE: SIGNIFICANT CHANGE UP
SODIUM SERPL-SCNC: 142 MMOL/L — SIGNIFICANT CHANGE UP (ref 135–145)
SOURCE RESPIRATORY: SIGNIFICANT CHANGE UP
TROPONIN T, HIGH SENSITIVITY RESULT: <6 NG/L — SIGNIFICANT CHANGE UP (ref 0–51)
WBC # BLD: 10.56 K/UL — HIGH (ref 3.8–10.5)
WBC # FLD AUTO: 10.56 K/UL — HIGH (ref 3.8–10.5)

## 2025-06-25 PROCEDURE — 70450 CT HEAD/BRAIN W/O DYE: CPT | Mod: 26

## 2025-06-25 PROCEDURE — 99285 EMERGENCY DEPT VISIT HI MDM: CPT

## 2025-06-25 PROCEDURE — 93010 ELECTROCARDIOGRAM REPORT: CPT

## 2025-06-25 PROCEDURE — 71260 CT THORAX DX C+: CPT | Mod: 26

## 2025-06-25 PROCEDURE — 74177 CT ABD & PELVIS W/CONTRAST: CPT | Mod: 26

## 2025-06-25 RX ORDER — ACETAMINOPHEN 500 MG/5ML
1000 LIQUID (ML) ORAL ONCE
Refills: 0 | Status: COMPLETED | OUTPATIENT
Start: 2025-06-25 | End: 2025-06-25

## 2025-06-25 RX ORDER — ONDANSETRON HCL/PF 4 MG/2 ML
4 VIAL (ML) INJECTION ONCE
Refills: 0 | Status: DISCONTINUED | OUTPATIENT
Start: 2025-06-25 | End: 2025-06-27

## 2025-06-25 RX ADMIN — Medication 1000 MILLILITER(S): at 21:09

## 2025-06-25 RX ADMIN — Medication 400 MILLIGRAM(S): at 21:10

## 2025-06-25 RX ADMIN — Medication 80 MILLIGRAM(S): at 21:11

## 2025-06-25 NOTE — ED PROVIDER NOTE - ATTENDING CONTRIBUTION TO CARE
Clarke Kraus MD (Attending Physician):    I performed a history and physical exam of the patient and discussed their management with the resident/fellow/ACP/student. I have reviewed the resident/fellow/ACP/student note and agree with the documented findings and plan of care, except as noted. I have personally performed a substantive portion of the visit including all aspects of the medical decision making. My medical decision making and observations are found below. Please refer to any progress notes for updates on clinical course.    HPI:  54-year-old female PMHx hypothyroidism, anemia, gastric sleeve 2008, diverticular bleed presents to ED for evaluation of 2 episodes hematemesis today, last around 3:45 PM.  Patient states that she has had diffuse lower abdominal discomfort for around the past week and also notes a BM last week that had BRB in it, but denies any further occurrences.  Patient states today her initial episode of vomiting appeared dark in color and the second episode appeared more maroon-colored.  Denies any history of prior.  Patient does endorse having her last endoscopy around 2 years ago which was noted to be wnl, colonoscopy also around 2 years ago and had 2 polyps removed.  Followed by Dr. France.  Not on AC.  Patient also mentions that she has had a headache, denies any numbness, tingling, focal weakness, vision changes, dizziness or lightheadedness.  She does state that she will occasionally get headaches and this feels similar to her prior headache.  Patient does also mention that her son was recently murdered and has not slept in a few days because of people coming to her house and notes that she is very stressed.  Took Tums PTA with no relief.  Further denies any fevers, cough, congestion, dysuria or hematuria, leg swelling, rashes.    PE:  Vitals noted  GEN - +In mild discomfort, A&Ox3  HEAD - NC/AT  EYES - PERRL, EOMI  ENT - Airway patent, +mucous membranes dry, oropharynx wnl (no coffee-ground emesis seen)  PULMONARY - Normal wob, CTA b/l, symmetric breath sounds, no W/R/R, satting 98% on RA  CARDIAC - +S1S2, RRR, no M/G/R, no JVD  ABDOMEN - +BS, ND, +TTP in epigastric area and b/l lower abd, soft, no guarding, no rebound, no masses, no rigidity   - No CVA TTP b/l  EXTREMITIES - FROM, symmetric pulses, no edema  SKIN - No rash or bruising  NEUROLOGIC - Alert, speech clear, moving all extremities spontaneously, no focal deficits  PSYCH - Normal mood/affect, normal insight    MDM:  DDx includes, but not limited to: upper GI bleed, complications from prior gastric sleeve, anemia, metabolic derangement, intracranial bleed, pancreatitis, atypical ACS, appendicitis, diverticulitis, UTI, kidney stone. ekg, cxr, CT head, CT chest, CT a/p, labs, protonix, tylenol, IVF, bariatric surgery c/s, GI c/s. Will likely require admission for endoscopy.

## 2025-06-25 NOTE — ED ADULT TRIAGE NOTE - CHIEF COMPLAINT QUOTE
c/o abdominal pain, vomiting black emesis this morning, vomiting black/red emesis around 6pm. hx diverticulitis.

## 2025-06-25 NOTE — ED ADULT NURSE NOTE - NS TRANSFER PATIENT BELONGINGS
Noted.     Appointment is scheduled 3/6/24 with Dagmar.   Patient scheduled appointment through [facility].    Clothing

## 2025-06-25 NOTE — ED ADULT NURSE NOTE - OBJECTIVE STATEMENT
Pt is a 55 yo female A/Ox4 reporting to the ED from home through triage for vomiting. Pt c/o abdominal pain, vomiting black emesis since this morning,  hx diverticulitis. Upon assessment, pt breathing comfortably on RA. Appears hemodynamically stable, no signs of cardiac distress at this time. Skin normal for race, no diaphoresis noted. Abdomen soft, non-tender and non-distended. Pt has full ROM in all extremities. Patient denies headache, dizziness, chest pain, palpitations, cough, SOB, urinary symptoms, fevers, chills, at this time.

## 2025-06-25 NOTE — ED ADULT NURSE NOTE - CHIEF COMPLAINT
Please med rec with patient, as recent physical notes taking wellbutrin and working well  
The patient is a 54y Female complaining of vomiting.
purulent drainage

## 2025-06-25 NOTE — ED PROVIDER NOTE - PHYSICAL EXAMINATION
awake, alert, nontoxic-appearing, speaking in full sentences with appropriate phonation, mildly uncomfortable appearing. EOMI, conjunctiva/sclera clear.  CV RRR, no MGR appreciated, lungs CTAB with no increased WOB, abdomen is soft, diffusely tender in the lower region, no R/G, no peritoneal signs.  Lower extremities equal in size and nonedematous bilaterally.  UE/LE pulses 2+ b/l.  No visible rashes or bruising.  No focal neurological deficits.

## 2025-06-25 NOTE — ED ADULT NURSE NOTE - NSFALLUNIVINTERV_ED_ALL_ED
Bed/Stretcher in lowest position, wheels locked, appropriate side rails in place/Call bell, personal items and telephone in reach/Instruct patient to call for assistance before getting out of bed/chair/stretcher/Non-slip footwear applied when patient is off stretcher/[place] to call system/Physically safe environment - no spills, clutter or unnecessary equipment/Purposeful proactive rounding/Room/bathroom lighting operational, light cord in reach

## 2025-06-25 NOTE — ED PROVIDER NOTE - CLINICAL SUMMARY MEDICAL DECISION MAKING FREE TEXT BOX
EM PGY-2/Bernard DO: 54-year-old female PMHx hypothyroidism, anemia, gastric sleeve 2008, diverticular bleed presents to ED for evaluation of 2 episodes hematemesis today, last around 3:45 PM.  Patient states that she has had diffuse lower abdominal discomfort for around the past week and also notes a BM last week that had BRB in it, but denies any further occurrences.  Patient states today her initial episode of vomiting appeared dark in color and the second episode appeared more maroon-colored.  Denies any history of prior.  Patient does endorse having her last endoscopy around 2 years ago which was noted to be okay, colonoscopy also around 2 years ago and had 2 polyps removed.  Followed by Dr. France.  Not on AC.  Patient also mentions that she has had a headache, denies any numbness, tingling, focal weakness, vision changes, dizziness or lightheadedness.  She does state that she will occasionally get headaches and this feels similar to her prior headache.  Patient does also mention that her son was recently murdered and has not slept in a few days because of people coming to her house and notes that she is very stressed.  Took Tums PTA with no relief.  Further denies any fevers, cough, congestion, dysuria or hematuria, leg swelling, rashes.  No other complaints.    MDM: 54-year-old man with above-mentioned history presenting for 2 episodes of hematemesis today and a BM last week in setting of abdominal pain.  On exam patient is awake, alert, nontoxic-appearing, speaking in full sentences with appropriate phonation, mildly uncomfortable appearing. EOMI, conjunctiva/sclera clear.  CV RRR, no MGR appreciated, lungs CTAB with no increased WOB, abdomen is soft, diffusely tender in the lower region, no R/G, no peritoneal signs.  Lower extremities equal in size and nonedematous bilaterally.  UE/LE pulses 2+ b/l.  No visible rashes or bruising.  No focal neurological deficits.  DDx GIB, diverticulitis, less likely ACS but will check troponin for atypical presentation, low suspicion for ICH or intracranial etiology but with shared decision making patient prefers to get a CT head.  Will get CT head, chest and abdomen to evaluate for varices/bleed, intra-abdominal etiology.  Will give Protonix and analgesia.  Anticipate admission.

## 2025-06-26 ENCOUNTER — RESULT REVIEW (OUTPATIENT)
Age: 55
End: 2025-06-26

## 2025-06-26 DIAGNOSIS — K92.0 HEMATEMESIS: ICD-10-CM

## 2025-06-26 DIAGNOSIS — Z90.3 ACQUIRED ABSENCE OF STOMACH [PART OF]: Chronic | ICD-10-CM

## 2025-06-26 LAB
ANION GAP SERPL CALC-SCNC: 13 MMOL/L — SIGNIFICANT CHANGE UP (ref 5–17)
BUN SERPL-MCNC: 7 MG/DL — SIGNIFICANT CHANGE UP (ref 7–23)
CALCIUM SERPL-MCNC: 8.8 MG/DL — SIGNIFICANT CHANGE UP (ref 8.4–10.5)
CHLORIDE SERPL-SCNC: 105 MMOL/L — SIGNIFICANT CHANGE UP (ref 96–108)
CO2 SERPL-SCNC: 23 MMOL/L — SIGNIFICANT CHANGE UP (ref 22–31)
CREAT SERPL-MCNC: 0.77 MG/DL — SIGNIFICANT CHANGE UP (ref 0.5–1.3)
EGFR: 92 ML/MIN/1.73M2 — SIGNIFICANT CHANGE UP
EGFR: 92 ML/MIN/1.73M2 — SIGNIFICANT CHANGE UP
GLUCOSE SERPL-MCNC: 98 MG/DL — SIGNIFICANT CHANGE UP (ref 70–99)
HCT VFR BLD CALC: 34.6 % — SIGNIFICANT CHANGE UP (ref 34.5–45)
HCT VFR BLD CALC: 34.8 % — SIGNIFICANT CHANGE UP (ref 34.5–45)
HGB BLD-MCNC: 10.7 G/DL — LOW (ref 11.5–15.5)
HGB BLD-MCNC: 10.8 G/DL — LOW (ref 11.5–15.5)
MAGNESIUM SERPL-MCNC: 2.1 MG/DL — SIGNIFICANT CHANGE UP (ref 1.6–2.6)
MCHC RBC-ENTMCNC: 25.5 PG — LOW (ref 27–34)
MCHC RBC-ENTMCNC: 25.8 PG — LOW (ref 27–34)
MCHC RBC-ENTMCNC: 30.7 G/DL — LOW (ref 32–36)
MCHC RBC-ENTMCNC: 31.2 G/DL — LOW (ref 32–36)
MCV RBC AUTO: 82.6 FL — SIGNIFICANT CHANGE UP (ref 80–100)
MCV RBC AUTO: 83.1 FL — SIGNIFICANT CHANGE UP (ref 80–100)
NRBC # BLD AUTO: 0 K/UL — SIGNIFICANT CHANGE UP (ref 0–0)
NRBC # BLD AUTO: 0 K/UL — SIGNIFICANT CHANGE UP (ref 0–0)
NRBC # FLD: 0 K/UL — SIGNIFICANT CHANGE UP (ref 0–0)
NRBC # FLD: 0 K/UL — SIGNIFICANT CHANGE UP (ref 0–0)
NRBC BLD AUTO-RTO: 0 /100 WBCS — SIGNIFICANT CHANGE UP (ref 0–0)
NRBC BLD AUTO-RTO: 0 /100 WBCS — SIGNIFICANT CHANGE UP (ref 0–0)
PHOSPHATE SERPL-MCNC: 2.7 MG/DL — SIGNIFICANT CHANGE UP (ref 2.5–4.5)
PLATELET # BLD AUTO: 386 K/UL — SIGNIFICANT CHANGE UP (ref 150–400)
PLATELET # BLD AUTO: 393 K/UL — SIGNIFICANT CHANGE UP (ref 150–400)
PMV BLD: 10.2 FL — SIGNIFICANT CHANGE UP (ref 7–13)
PMV BLD: 10.4 FL — SIGNIFICANT CHANGE UP (ref 7–13)
POTASSIUM SERPL-MCNC: 3.8 MMOL/L — SIGNIFICANT CHANGE UP (ref 3.5–5.3)
POTASSIUM SERPL-SCNC: 3.8 MMOL/L — SIGNIFICANT CHANGE UP (ref 3.5–5.3)
RBC # BLD: 4.19 M/UL — SIGNIFICANT CHANGE UP (ref 3.8–5.2)
RBC # BLD: 4.19 M/UL — SIGNIFICANT CHANGE UP (ref 3.8–5.2)
RBC # FLD: 14.6 % — HIGH (ref 10.3–14.5)
RBC # FLD: 14.6 % — HIGH (ref 10.3–14.5)
SODIUM SERPL-SCNC: 141 MMOL/L — SIGNIFICANT CHANGE UP (ref 135–145)
TROPONIN T, HIGH SENSITIVITY RESULT: <6 NG/L — SIGNIFICANT CHANGE UP (ref 0–51)
WBC # BLD: 6.68 K/UL — SIGNIFICANT CHANGE UP (ref 3.8–10.5)
WBC # BLD: 8.02 K/UL — SIGNIFICANT CHANGE UP (ref 3.8–10.5)
WBC # FLD AUTO: 6.68 K/UL — SIGNIFICANT CHANGE UP (ref 3.8–10.5)
WBC # FLD AUTO: 8.02 K/UL — SIGNIFICANT CHANGE UP (ref 3.8–10.5)

## 2025-06-26 PROCEDURE — 99223 1ST HOSP IP/OBS HIGH 75: CPT | Mod: GC,25

## 2025-06-26 PROCEDURE — 85018 HEMOGLOBIN: CPT

## 2025-06-26 PROCEDURE — 70450 CT HEAD/BRAIN W/O DYE: CPT

## 2025-06-26 PROCEDURE — 36415 COLL VENOUS BLD VENIPUNCTURE: CPT

## 2025-06-26 PROCEDURE — 85730 THROMBOPLASTIN TIME PARTIAL: CPT

## 2025-06-26 PROCEDURE — 84484 ASSAY OF TROPONIN QUANT: CPT

## 2025-06-26 PROCEDURE — 83690 ASSAY OF LIPASE: CPT

## 2025-06-26 PROCEDURE — 71260 CT THORAX DX C+: CPT

## 2025-06-26 PROCEDURE — 84100 ASSAY OF PHOSPHORUS: CPT

## 2025-06-26 PROCEDURE — 82803 BLOOD GASES ANY COMBINATION: CPT

## 2025-06-26 PROCEDURE — 43239 EGD BIOPSY SINGLE/MULTIPLE: CPT | Mod: GC

## 2025-06-26 PROCEDURE — 80053 COMPREHEN METABOLIC PANEL: CPT

## 2025-06-26 PROCEDURE — 84132 ASSAY OF SERUM POTASSIUM: CPT

## 2025-06-26 PROCEDURE — 74177 CT ABD & PELVIS W/CONTRAST: CPT

## 2025-06-26 PROCEDURE — 85027 COMPLETE CBC AUTOMATED: CPT

## 2025-06-26 PROCEDURE — 0241U: CPT

## 2025-06-26 PROCEDURE — 82947 ASSAY GLUCOSE BLOOD QUANT: CPT

## 2025-06-26 PROCEDURE — 83735 ASSAY OF MAGNESIUM: CPT

## 2025-06-26 PROCEDURE — 85014 HEMATOCRIT: CPT

## 2025-06-26 PROCEDURE — 82435 ASSAY OF BLOOD CHLORIDE: CPT

## 2025-06-26 PROCEDURE — 86850 RBC ANTIBODY SCREEN: CPT

## 2025-06-26 PROCEDURE — 83605 ASSAY OF LACTIC ACID: CPT

## 2025-06-26 PROCEDURE — 86900 BLOOD TYPING SEROLOGIC ABO: CPT

## 2025-06-26 PROCEDURE — 82330 ASSAY OF CALCIUM: CPT

## 2025-06-26 PROCEDURE — 85025 COMPLETE CBC W/AUTO DIFF WBC: CPT

## 2025-06-26 PROCEDURE — 86901 BLOOD TYPING SEROLOGIC RH(D): CPT

## 2025-06-26 PROCEDURE — 80048 BASIC METABOLIC PNL TOTAL CA: CPT

## 2025-06-26 PROCEDURE — 84295 ASSAY OF SERUM SODIUM: CPT

## 2025-06-26 PROCEDURE — 85610 PROTHROMBIN TIME: CPT

## 2025-06-26 PROCEDURE — 88305 TISSUE EXAM BY PATHOLOGIST: CPT | Mod: 26

## 2025-06-26 PROCEDURE — 88312 SPECIAL STAINS GROUP 1: CPT | Mod: 26

## 2025-06-26 RX ORDER — ACETAMINOPHEN 500 MG/5ML
1000 LIQUID (ML) ORAL EVERY 6 HOURS
Refills: 0 | Status: COMPLETED | OUTPATIENT
Start: 2025-06-26 | End: 2025-06-27

## 2025-06-26 RX ORDER — SODIUM CHLORIDE 9 G/1000ML
1000 INJECTION, SOLUTION INTRAVENOUS
Refills: 0 | Status: DISCONTINUED | OUTPATIENT
Start: 2025-06-26 | End: 2025-06-26

## 2025-06-26 RX ORDER — HEPARIN SODIUM 1000 [USP'U]/ML
5000 INJECTION INTRAVENOUS; SUBCUTANEOUS EVERY 8 HOURS
Refills: 0 | Status: DISCONTINUED | OUTPATIENT
Start: 2025-06-26 | End: 2025-06-27

## 2025-06-26 RX ORDER — LEVOTHYROXINE SODIUM 300 MCG
60 TABLET ORAL AT BEDTIME
Refills: 0 | Status: DISCONTINUED | OUTPATIENT
Start: 2025-06-26 | End: 2025-06-27

## 2025-06-26 RX ORDER — SODIUM CHLORIDE 9 G/1000ML
1000 INJECTION, SOLUTION INTRAVENOUS
Refills: 0 | Status: COMPLETED | OUTPATIENT
Start: 2025-06-26 | End: 2025-06-26

## 2025-06-26 RX ORDER — ATORVASTATIN CALCIUM 80 MG/1
0 TABLET, FILM COATED ORAL
Refills: 0 | DISCHARGE

## 2025-06-26 RX ORDER — SODIUM CHLORIDE 9 G/1000ML
1000 INJECTION, SOLUTION INTRAVENOUS
Refills: 0 | Status: DISCONTINUED | OUTPATIENT
Start: 2025-06-26 | End: 2025-06-27

## 2025-06-26 RX ORDER — FOLIC ACID 1 MG/1
1 TABLET ORAL ONCE
Refills: 0 | Status: COMPLETED | OUTPATIENT
Start: 2025-06-26 | End: 2025-06-26

## 2025-06-26 RX ORDER — SEMAGLUTIDE 1 MG/.5ML
0 INJECTION, SOLUTION SUBCUTANEOUS
Refills: 0 | DISCHARGE

## 2025-06-26 RX ORDER — LEVOTHYROXINE SODIUM 300 MCG
1 TABLET ORAL
Refills: 0 | DISCHARGE

## 2025-06-26 RX ADMIN — Medication 400 MILLIGRAM(S): at 19:11

## 2025-06-26 RX ADMIN — Medication 60 MICROGRAM(S): at 21:35

## 2025-06-26 RX ADMIN — FOLIC ACID 1 MILLIGRAM(S): 1 TABLET ORAL at 19:12

## 2025-06-26 RX ADMIN — Medication 40 MILLIGRAM(S): at 19:12

## 2025-06-26 RX ADMIN — Medication 1000 MILLIGRAM(S): at 06:27

## 2025-06-26 RX ADMIN — SODIUM CHLORIDE 100 MILLILITER(S): 9 INJECTION, SOLUTION INTRAVENOUS at 05:57

## 2025-06-26 RX ADMIN — Medication 40 MILLIGRAM(S): at 05:57

## 2025-06-26 RX ADMIN — HEPARIN SODIUM 5000 UNIT(S): 1000 INJECTION INTRAVENOUS; SUBCUTANEOUS at 21:34

## 2025-06-26 RX ADMIN — Medication 400 MILLIGRAM(S): at 05:57

## 2025-06-26 NOTE — H&P ADULT - ATTENDING COMMENTS
54yF with hx sleeve gastrectomy, hiatal hernia with long hx of dysphagia to both solids and liquids.  Admitted for 1 day of nausea, vomiting and dark stool. Also noted some blood in emesis. Anemia noted on labs. Hemodynamically stable. Patient is comfortable and reports no pain this morning. Nausea has resolved.    Vitals nl  Abd soft, nontender, nondistended    CT and labs reviewed    Plan for GI consult for EGD today  NPO for time being  PPI  Pt is receiving IV multivitamins/thiamine  After completing GI workup, patient is a candidate for elective hiatal hernia repair with conversion of sleeve to RYGB for treatment of dysphagia, reflux, and obesity. Discussed with patient and will see her as outpatient for surgical planning.    Luiz Medina MD

## 2025-06-26 NOTE — CHART NOTE - NSCHARTNOTEFT_GEN_A_CORE
Post Operative Note  Patient: EAMON LU 54y (1970) Female   MRN: [id number]  Location: [facility] 2MON 214 D1  Visit: 06-26-25 Inpatient  Patient Seen and Examined: 06-26-25 @ 20:11    Procedure: S/P upper endoscopy with GI    Subjective: Patient seen and examined post operatively. Reports pain as controlled. has some mild pain in the epigastrium. Tolerating clears. -/-. No more hematemesis.      Objective:  Vitals: T(F): 97.9 (06-26-25 @ 17:45), Max: 98.7 (06-25-25 @ 21:20)  HR: 61 (06-26-25 @ 17:45)  BP: 143/89 (06-26-25 @ 17:45) (125/67 - 161/74)  RR: 18 (06-26-25 @ 17:45)  SpO2: 100% (06-26-25 @ 17:45)    Physical Examination:  General: NAD, resting comfortably in bed  HEENT: Normocephalic atraumatic  Respiratory: Nonlabored respirations, normal CW expansion.  Cardio: S1S2, regular rate and rhythm.  Abdomen: soft, nondistended, minimally tender in the epigastric area  Vascular: extremities are warm and well perfused.     Imaging:  No post-op imaging studies    Assessment:  54y Female patient w/ PSHx of sleeve gastrectomy in 2009 who presented with hematochezia and hematemasis. s/p upper endoscopy with GI with no active sources of bleeding, but with a medium sized hiatal hernia    Plan:   - Pain control PRN  - Diet: CLD  - IVF  - Activity: as tolerated  - DVT ppx: SCD's & subq heparin  - NPO at midnight for barium swallow

## 2025-06-26 NOTE — H&P ADULT - ASSESSMENT
Ms. Villela is a 54 year old woman with a pertinent history of laparoscopic sleeve gastrectomy and cholecystectomy (Dr. Roscoe Boggs, 2009) presenting with one week of blood per rectum and one day of nausea with reported hematemesis, found to have a small hiatal hernia (seen on prior imaging from 2022) but no other radiographic changes to suggest an etiology of her bleeding. Suspect upper gastrointestinal source, for which differential includes esophagitis, gastritis, Melquiades’s ulcer, or peptic ulcer.     Plan  - Admit to bariatric surgery under Dr. Luiz Medina.  - Gastroenterology consult e-mailed for endoscopic evaluation of upper gastrointestinal tract.   - Nil per os, crystalloid at maintenance rate while not eating.  - "Banana bag" of 1000 cc normal saline at 100 cc/hr with 10 cc multivitamin ampule, 1 mg folate, and 100 mg thiamine.   - Medication reconciliation partially completed. Patient provided medications to the best of her recollection with some discrepancies compared with last reconciliation in 2022. Will call pharmacy to confirm in the morning.   - Sequential compression devices for deep venous thrombosis prophylaxis. No pharmacologic prophylaxis in the setting of ongoing bleeding.   - Complete blood count every six hours until hemoglobin stabilizes.   - Acetaminophen for analgesia. Non-steroidal anti-inflammatory drugs (NSAID) contraindicated in the setting of gastrointestinal bleeding.     Case discussed with bariatric surgical fellow Dr. Josh Borjas on behalf of attending surgeon Dr. Luiz Medina.     Sammy Mireles, PGY-2  Bariatric Surgery - [facility] ([id number])

## 2025-06-26 NOTE — H&P ADULT - NSHPPHYSICALEXAM_GEN_ALL_CORE
T(C): 36.7 (26 Jun 2025 03:38), Max: 37.1 (25 Jun 2025 21:20)  HR: 70 (26 Jun 2025 03:38) (66 - 77)  BP: 145/78 (26 Jun 2025 03:38) (139/70 - 182/95)  RR: 19 (26 Jun 2025 03:38) (18 - 20)  SpO2: 99% (26 Jun 2025 03:38) (98% - 99%)    Height (cm): 167.6 (06-25)  Weight (kg): 104.8 (06-25)  BMI (kg/m2): 37.3 (06-25)  BSA (m2): 2.13 (06-25)    General: Resting comfortably in bed in no acute distress.   Neurologic: Alert, oriented, and appropriately responds to questions.   Psychiatric: Euthymic mood, calm affect, linear thought process, appropriate thought content.   Respiratory: Equal chest wall expansion bilaterally with no accessory muscle use, no tachypnea, and no grossly increased work of breathing on room air.   Cardiac: Regular rate and rhythm by palpation of peripheral pulses. Brisk capillary refill.   Abdomen: Soft and nondistended. Very mild focal tenderness to palpation in the lower epigastrium and left lower quadrant. No rebound tenderness or guarding is elicited. Well-healed prior laparoscopy incisions.   Extremities: Warm and well-perfused. No lower extremity edema.

## 2025-06-26 NOTE — CONSULT NOTE ADULT - ATTENDING COMMENTS
Agree with above. Patient presents with hematemesis, small amounts yesterday 3:45PM. No nausea, no more vomiting since. No abdominal pain. She reports chronic dysphagia to both solid/liquids x 5 months, but no associated weight loss. Reports both food getting caught in her throat, as well as lower down in her chest. Will plan for EGD +/- Endoflip today. Keep NPO.

## 2025-06-26 NOTE — ED ADULT NURSE REASSESSMENT NOTE - NS ED NURSE REASSESS COMMENT FT1
On re-assessment patient currently appears comfortable resting in stretcher in room with appropriate side rails up for safety. Patient is awake, A&Ox4, Plan of care discussed with patient. Patient pending Surgery consult.

## 2025-06-26 NOTE — PRE PROCEDURE NOTE - PRE PROCEDURE EVALUATION
Attending Physician: Stanislaw Castano MD    Procedure: EGD    Indication for Procedure: Hematemesis, dysphagia  ________________________________________________________  PAST MEDICAL & SURGICAL HISTORY:  Migraines      Anemia      Anxiety      Migraines      Hypertension      History of cholecystectomy      History of sleeve gastrectomy        ALLERGIES:  oxycodone (Rash)  morphine (Short breath)  morphine (Stomach Upset)  Augmentin (Unknown)    HOME MEDICATIONS:  atorvastatin: once a day (at bedtime)  famotidine 40 mg oral tablet: 1 tab(s) orally once a day (at bedtime)  levothyroxine 112 mcg (0.112 mg) oral tablet: 1 tab(s) orally Daily on Mondays, Tuesdays and Wednesdays.  levothyroxine 125 mcg (0.125 mg) oral tablet: 1 tab(s) orally Daily on Thursdays, Fridays, Saturdays and Sundays.  magnesium oxide 400 mg oral tablet: 1 tab(s) orally once a day (at bedtime)  methIMAzole 5 mg oral tablet: 1 tab(s) orally every 8 hours  Multiple Vitamins with Iron oral tablet: 1 tab(s) orally once a day  semaglutide: once a week  Vitamin D2 1.25 mg (50,000 intl units) oral capsule: 1 cap(s) orally once a week (on monday)    AICD/PPM: [ ] yes   [x ] no    PERTINENT LAB DATA:                        10.7   6.68  )-----------( 393      ( 26 Jun 2025 11:09 )             34.8     06-26    141  |  105  |  7   ----------------------------<  98  3.8   |  23  |  0.77    Ca    8.8      26 Jun 2025 06:40  Phos  2.7     06-26  Mg     2.1     06-26    TPro  7.4  /  Alb  4.0  /  TBili  0.2  /  DBili  x   /  AST  11  /  ALT  8[L]  /  AlkPhos  112  06-25    PT/INR - ( 25 Jun 2025 21:27 )   PT: 11.1 sec;   INR: 0.96 ratio         PTT - ( 25 Jun 2025 21:27 )  PTT:33.5 sec            PHYSICAL EXAMINATION:    Height (cm): 167.6  Weight (kg): 104.8  BMI (kg/m2): 37.3  BSA (m2): 2.13T(C): 36.6  HR: 55  BP: 158/74  RR: 17  SpO2: 99%    Constitutional: NAD  HEENT: PERRLA, EOMI,    Neck:  No JVD  Respiratory: CTAB/L  Cardiovascular: S1 and S2  Gastrointestinal: BS+, soft, NT/ND  Extremities: No peripheral edema  Neurological: A/O x 3, no focal deficits  Psychiatric: Normal mood, normal affect  Skin: No rashes    ASA Class: I [ ]  II [ x]  III [ ]  IV [ ]    COMMENTS:    The patient is a suitable candidate for the planned procedure unless box checked [ ]  No, explain:     [Rectal Pain: Grade 0] : Rectal Pain: Grade 0 [Dermatitis Radiation: Grade 0] : Dermatitis Radiation: Grade 0 [Diarrhea: Grade 1 - Increase of <4 stools per day over baseline; mild increase in ostomy output compared to baseline] : Diarrhea: Grade 1 - Increase of <4 stools per day over baseline; mild increase in ostomy output compared to baseline [Vomiting: Grade 1 - 1 - 2 episodes ( by 5 minutes) in 24 hrs] : Vomiting: Grade 1 - 1 - 2 episodes ( by 5 minutes) in 24 hrs [Fatigue: Grade 1 - Fatigue relieved by rest] : Fatigue: Grade 1 - Fatigue relieved by rest

## 2025-06-26 NOTE — H&P ADULT - NSHPLABSRESULTS_GEN_ALL_CORE
CBC Basic (06-25 @ 21:27)  WBC: 10.56 Hgb: 10.8 Hct: 35.1 Plt: 402    Metabolic Panel (06-25 @ 21:27)  142  |  106  |  9  ----------------------------<  104  4.1   |  25  |  0.83  Ca: 9.4/Phos: x/Magnesium: 2.2    Liver Chemistries (06-25 @ 21:27)  Total protein 7.4 | Albumin 4.0  TBili 0.2 | DBili x  AST 11 | ALT 8 | AlkPhos 112    Coagulation Studies (06-25 @ 21:27)  PT/INR: 11.1/0.96, aPTT: 33.5    CT Abdomen and Pelvis w/ IV Cont (06-25 @ 23:11)  Status post gastric sleeve with small hiatal hernia. No active extravasation to suggest bleeding.

## 2025-06-26 NOTE — H&P ADULT - HISTORY OF PRESENT ILLNESS
Ms. Villela is a 54 year old woman with a pertinent history of laparoscopic sleeve gastrectomy and cholecystectomy (Dr. Roscoe Boggs, 2009) presenting with one week of blood per rectum and one day of nausea with reported hematemesis. She describes two episodes of emesis, one that was dark brown and a second that included a small volume of bright blood. She reportedly had prior lower abdominal pain though this had resolved by the time of my evaluation. She endorses recent  denies associated fevers, chills, chest pain, shortness of breath, dysuria, hematuria, or oliguria. She is passing regular flatus and her nausea has abated since her last episode of emesis (6/25 at 16:00). She reports that her last colonoscopy and .egdwas two years ago, and that she was only found to have benign colonic polyps, with no abnormalities on EGD.     In the Emergency Department she is hemodynamically stable and in no acute distress. Laboratory studies are notable for anemia (hemoglobin 10.8, hematocrit 35.1). The remainder of her complete blood count, basic metabolic panel, venous lactate, and liver chemistries are within normal limits. Cross-sectional imaging reveals a small hiatal hernia that appears unchanged compared with imaging from three years prior.

## 2025-06-26 NOTE — PATIENT PROFILE ADULT - PATIENT REPRESENTATIVE: ( YOU CAN CHOOSE ANY PERSON THAT CAN ASSIST YOU WITH YOUR HEALTH CARE PREFERENCES, DOES NOT HAVE TO BE A SPOUSE, IMMEDIATE FAMILY OR SIGNIFICANT OTHER/PARTNER)
[facility] - Urology 4th Floor  [address]  [place] [address]  Phone: [phone]                  Sreekanth Pitts Jr.   2017 2:00 PM   Office Visit    Description:  Male : 1946   Provider:  Uma Gaona MD   Department:  [facility] - Urolog 4th Floor           Reason for Visit     Advice Only     Elevated PSA           Diagnoses this Visit        Comments    Urinary retention    -  Primary     Penile pain                To Do List           Future Appointments        Provider Department Dept Phone    2017 8:40 AM Rubio Rod MD [facility] - Internal Medicine [phone]      Goals (5 Years of Data)     None       These Medications        Disp Refills Start End    dutasteride (AVODART) 0.5 mg capsule 30 capsule 0 2017    Take 1 capsule (0.5 mg total) by mouth once daily. - Oral    Pharmacy: [facility] - [place], [place] - [address] [place] [address] Ph #: [phone]       lidocaine HCL 2% (XYLOCAINE) 2 % jelly 30 mL 3 2017     Apply topically as needed. - Topical (Top)    Pharmacy: [facility] - [place], [place] - [address] [place] [address] Ph #: [phone]         [facility] On [facility]     [facility] Nurse Care Line -  Assistance  Registered nurses in the [facility] provide clinical advisement, health education, appointment booking, and other advisory services.  Call for this free service at [phone].             Medications           Message regarding Medications     Verify the changes and/or additions to your medication regime listed below are the same as discussed with your clinician today.  If any of these changes or additions are incorrect, please notify your healthcare provider.        START taking these NEW medications        Refills    dutasteride (AVODART) 0.5 mg capsule 0    Sig: Take 1 capsule (0.5 mg total) by mouth once daily.    Class: Normal    Route: Oral    lidocaine HCL 2% (XYLOCAINE) 2 % jelly 3    Sig: Apply topically as  "needed.    Class: Normal    Route: Topical (Top)           Verify that the below list of medications is an accurate representation of the medications you are currently taking.  If none reported, the list may be blank. If incorrect, please contact your healthcare provider. Carry this list with you in case of emergency.           Current Medications     dutasteride (AVODART) 0.5 mg capsule Take 1 capsule (0.5 mg total) by mouth once daily.    lidocaine HCL 2% (XYLOCAINE) 2 % jelly Apply topically as needed.    lisinopril (PRINIVIL,ZESTRIL) 20 MG tablet Take 1 tablet (20 mg total) by mouth once daily.    MULTIVITAMIN W-MINERALS/LUTEIN (CENTRUM SILVER ORAL) Take 1 tablet by mouth once daily.     tamsulosin (FLOMAX) 0.4 mg Cp24 Take 1 capsule (0.4 mg total) by mouth once daily.           Clinical Reference Information           Vital Signs - Last Recorded  Most recent update: 1/27/2017  1:51 PM by Kelly De Paz MA    BP Pulse Ht Wt BMI    112/77 96 6' 1" (1.854 m) 117.5 kg (259 lb 0.7 oz) 34.18 kg/m2      Blood Pressure          Most Recent Value    BP  112/77      Allergies as of 1/27/2017     No Known Allergies      Immunizations Administered on Date of Encounter - 1/27/2017     None      " declines

## 2025-06-26 NOTE — CONSULT NOTE ADULT - ASSESSMENT
EAMON LU is a 54year old Female with history of laparoscopic sleeve gastrectomy and cholecystectomy (2009), hypothyroidism presenting in the setting of hematemesis.      #Hematemesis  #Epigastric Pain  #Hx Sleeve gastrectomy 2009   Patient w/ 2 day hx of nausea/vomiting of coffee ground emesis, followed by 1 episode of hematemesis, associated w/ epigastric pain over the past few days. Hgb on arrival 10.8 (baseline 12 in 2022), BUN/CR 11/0.7. No further hematemesis since yesterday and w/ stable Hgb on recheck. Suspect resolving bleeding episode.   Possible UGIB given hx, suspect PUD given hx of NSAID use though differential would include gastritis, esophagitis, AVM, Dieulafoy lesion, florencio lesion.       #Rectal bleeding  Patient denies any melena, hematochezia, reports some streaks of blood when wiping, suspect this is likely hemorrhoidal.       Recommendations:  - Keep NPO  - Plan for EGD today  - Pantoprazole 40 mg IV BID  - Keep Hgb > 7, active T&S    Note incomplete until finalized by attending signature/attestation.    Chadd Simms  GI/Hepatology Fellow, PGY-4    MONDAY-FRIDAY 8AM-5PM:  Please message via [place] or email [email or web address]     On Weekends/Holidays (All day) and Weekdays after 5 PM to 8 AM  For nonurgent consults please email:  Please email [email or web address]  For urgent consults:  Please contact on call GI team. See Amion schedule ([facility]), [place]ing system ([facility]), or call hospital  ([facility])  EAMON LU is a 54year old Female with history of laparoscopic sleeve gastrectomy and cholecystectomy (2009), hypothyroidism presenting in the setting of hematemesis.      #Hematemesis  #Epigastric Pain  #Hx Sleeve gastrectomy 2009   Patient w/ 2 day hx of nausea/vomiting of coffee ground emesis, followed by 1 episode of hematemesis, associated w/ epigastric pain over the past few days. Hgb on arrival 10.8 (baseline 12 in 2022), BUN/CR 11/0.7. No further hematemesis since yesterday and w/ stable Hgb on recheck. Suspect resolving bleeding episode.   Possible UGIB given hx, suspect PUD given hx of NSAID use though differential would include gastritis, esophagitis, AVM, Dieulafoy lesion, florencio lesion.     #Dysphagia   Patient reports intermittent episodes of globus sensation as well as ?esophageal dysphagia w/ food feeling like it gets stuck on both her throat and chest, w/ symptoms to both solids and liquids, reports it has been worsening. Reports associated heartburn. Last EGD 2 years ago was wnl.   Differential includes esophagitis vs esophageal stricture vs achalasia vs esophageal dysmotility. Given issues w/ initiation of swallow as well, concern for possible concomitant oropharyngeal dysphagia.     #Rectal bleeding  Patient denies any melena, hematochezia, reports some streaks of blood when wiping, suspect this is likely hemorrhoidal.       Recommendations:  - Keep NPO  - Plan for EGD w/ endoflip   - Barium esophogram  - Recommend S/S eval   - Pantoprazole 40 mg IV BID  - Keep Hgb > 7, active T&S  - Recommend outpatient colonoscopy for rectal outlet bleeding.     Note incomplete until finalized by attending signature/attestation.    Chadd Simms  GI/Hepatology Fellow, PGY-4    MONDAY-FRIDAY 8AM-5PM:  Please message via [employer] [place] [employer] or email [email or web address]     On Weekends/Holidays (All day) and Weekdays after 5 PM to 8 AM  For nonurgent consults please email:  Please email [email or web address]  For urgent consults:  Please contact on call GI team. See Amion schedule ([facility]), [place] paging system ([facility]), or call hospital  ([facility])  EAMON LU is a 54year old Female with history of laparoscopic sleeve gastrectomy and cholecystectomy (2009), hypothyroidism presenting in the setting of hematemesis.      #Hematemesis  #Epigastric Pain  #Hx Sleeve gastrectomy 2009   Patient w/ 2 day hx of nausea/vomiting of coffee ground emesis, followed by 1 episode of hematemesis, associated w/ epigastric pain over the past few days. Hgb on arrival 10.8 (baseline 12 in 2022), BUN/CR 11/0.7. No further hematemesis since yesterday and w/ stable Hgb on recheck. Suspect resolving bleeding episode.   Possible UGIB given hx, suspect PUD given hx of NSAID use though differential would include gastritis, esophagitis, AVM, Dieulafoy lesion, florencio lesion.     #Dysphagia   Patient reports intermittent episodes of globus sensation as well as ?esophageal dysphagia w/ food feeling like it gets stuck on both her throat and chest, w/ symptoms to both solids and liquids, reports it has been worsening. Reports associated heartburn. Last EGD 2 years ago was wnl.   Differential includes esophagitis vs esophageal stricture vs achalasia vs esophageal dysmotility. Given issues w/ initiation of swallow as well, concern for possible concomitant oropharyngeal dysphagia.     #Rectal bleeding  Patient denies any melena, hematochezia, reports some streaks of blood when wiping, suspect this is likely hemorrhoidal.       Recommendations:  - Keep NPO  - Plan for EGD +/- endoflip   - Barium esophogram  - Recommend S/S eval   - Pantoprazole 40 mg IV BID  - Keep Hgb > 7, active T&S  - Recommend outpatient colonoscopy for rectal outlet bleeding.     Note incomplete until finalized by attending signature/attestation.    Chadd Simms  GI/Hepatology Fellow, PGY-4    MONDAY-FRIDAY 8AM-5PM:  Please message via [employer] or email [email or web address]     On Weekends/Holidays (All day) and Weekdays after 5 PM to 8 AM  For nonurgent consults please email:  Please email [email or web address]  For urgent consults:  Please contact on call GI team. See Amion schedule ([facility]), [place] paging system ([facility]), or call hospital  ([facility])

## 2025-06-26 NOTE — PATIENT PROFILE ADULT - FALL HARM RISK - UNIVERSAL INTERVENTIONS
Bed in lowest position, wheels locked, appropriate side rails in place/Call bell, personal items and telephone in reach/Instruct patient to call for assistance before getting out of bed or chair/Non-slip footwear when patient is out of bed/[place] to call system/Physically safe environment - no spills, clutter or unnecessary equipment/Purposeful Proactive Rounding/Room/bathroom lighting operational, light cord in reach

## 2025-06-26 NOTE — PRE-ANESTHESIA EVALUATION ADULT - NSANTHPMHFT_GEN_ALL_CORE
54 year old woman with a pertinent history of laparoscopic sleeve gastrectomy and cholecystectomy (Dr. Roscoe Boggs, 2009) presenting with one week of blood per rectum and one day of nausea with reported hematemesis. She describes two episodes of emesis, one that was dark brown and a second that included a small volume of bright blood    METS>4, denies CP/SOB;  last hematemesis yesterday afternoon, denies N/V

## 2025-06-26 NOTE — CONSULT NOTE ADULT - SUBJECTIVE AND OBJECTIVE BOX
Chief Complaint:  Patient is a 54y old  Female who presents with a chief complaint of Gastrointestinal bleed. (26 Jun 2025 04:03)      HPI:  EAMON LU is a 54year old Female with history of laparoscopic sleeve gastrectomy and cholecystectomy (2009), hypothyroidism presenting in the setting of hematemesis.    The patient reported that 2 days ago she had 2 episodes of emesis, which she describes as dark brown, coffee ground emesis, and a second episodes with a small tinge fof dark blood. Yesterday she had one episode of hematemesis w/ dark blood in emesis, prompting her to present to the hospital. She also reported associated epigastric pain w/ nausea for the past 2 days. She reports that she has been taking motrin 600 mg BID for the past 3-4 days due to a knot in her neck. No other recent changes to medications.  Her last EGD and colonoscopy was 2 years ago for screening purposes, colonoscopy w/ benign polyps, EGD otherwise wnl. Patient denies any prior episodes of hematemesis, coffee ground emesis or GI bleeding.     In the ED patient was otherwise stable. Hgb was 10.8 (baseline 12 in 2022). Patient admitted for likely UGIB.         ROS:   General:  No fevers, chills, night sweats  Eyes:  Good vision, no reported pain  ENT:  No sore throat, pain, runny nose  CV:  No pain, palpitations  Pulm:  No dyspnea, cough  GI:  See HPI, otherwise negative  :  No incontinence, nocturia  Muscle:  No reported pain, weakness  Neuro:  No memory problems  Psych:  No insomnia, psychosis  Endocrine:  No polyuria, polydipsia  Heme:  No petechiae, ecchymosis, easy bruisability  Skin:  No reported rash    PMHX/PSHX:    Migraines    Anemia    Anxiety    Migraines    Hypertension    Gastric banding status    S/P cholecystectomy    History of cholecystectomy    History of sleeve gastrectomy      Allergies:  oxycodone (Rash)  morphine (Short breath)  morphine (Stomach Upset)  Augmentin (Unknown)      Home Medications: reviewed  Hospital Medications:  acetaminophen   IVPB .. 1000 milliGRAM(s) IV Intermittent every 6 hours  folic acid Injectable 1 milliGRAM(s) IV Push once  lactated ringers. 1000 milliLiter(s) IV Continuous <Continuous>  levothyroxine Injectable 60 MICROGram(s) IV Push at bedtime  ondansetron Injectable 4 milliGRAM(s) IV Push once  pantoprazole  Injectable 40 milliGRAM(s) IV Push every 12 hours  thiamine Injectable 100 milliGRAM(s) IV Push once      Social History:   Tobacco: Denies  EtOH: Denies  Illicit Drugs: Denies    Family history:    No pertinent family history in first degree relatives    FH: lung cancer (Father)    Family history of pancreatic cancer (Father)    FH: type 2 diabetes (Mother)      Denies family history of colon cancer/polyps, stomach cancer/polyps, pancreatic cancer/masses, liver cancer/disease, ovarian cancer and endometrial cancer.    PHYSICAL EXAM:   Vital Signs:  Vital Signs Last 24 Hrs  T(C): 36.6 (26 Jun 2025 04:27), Max: 37.1 (25 Jun 2025 21:20)  T(F): 97.8 (26 Jun 2025 04:27), Max: 98.7 (25 Jun 2025 21:20)  HR: 56 (26 Jun 2025 04:27) (56 - 77)  BP: 135/77 (26 Jun 2025 04:27) (135/77 - 182/95)  BP(mean): --  RR: 17 (26 Jun 2025 04:27) (17 - 20)  SpO2: 100% (26 Jun 2025 04:27) (98% - 100%)    Parameters below as of 26 Jun 2025 04:27  Patient On (Oxygen Delivery Method): room air      Daily Height in cm: 167.64 (25 Jun 2025 19:12)    Daily     GENERAL: no acute distress  NEURO: alert  HEENT: anicteric sclera, no conjunctival pallor appreciated  CHEST: no respiratory distress, no accessory muscle use  CARDIAC: regular rate, +S1/S2  ABDOMEN: epigastric TTP   EXTREMITIES: warm, well perfused, no edema  SKIN: no lesions noted    LABS: reviewed                        10.8   8.02  )-----------( 386      ( 26 Jun 2025 06:40 )             34.6     06-26    141  |  105  |  7   ----------------------------<  98  3.8   |  23  |  0.77    Ca    8.8      26 Jun 2025 06:40  Phos  2.7     06-26  Mg     2.1     06-26    TPro  7.4  /  Alb  4.0  /  TBili  0.2  /  DBili  x   /  AST  11  /  ALT  8[L]  /  AlkPhos  112  06-25    LIVER FUNCTIONS - ( 25 Jun 2025 21:27 )  Alb: 4.0 g/dL / Pro: 7.4 g/dL / ALK PHOS: 112 U/L / ALT: 8 U/L / AST: 11 U/L / GGT: x               Diagnostic Studies: see sunrise for full report         Chief Complaint:  Patient is a 54y old  Female who presents with a chief complaint of Gastrointestinal bleed. (26 Jun 2025 04:03)      HPI:  EAMON LU is a 54year old Female with history of laparoscopic sleeve gastrectomy and cholecystectomy (2009), hypothyroidism presenting in the setting of hematemesis.    The patient reported that 2 days ago she had 2 episodes of emesis, which she describes as dark brown, coffee ground emesis, and a second episodes with a small tinge fof dark blood. Yesterday she had one episode of hematemesis w/ dark blood in emesis, prompting her to present to the hospital. She also reported associated epigastric pain w/ nausea for the past 2 days. She reports that she has been taking motrin 600 mg BID for the past 3-4 days due to a knot in her neck. No other recent changes to medications.    The patient reports that she has also been experiencing dysphagia intermittently to both solids and liquids, with food intermittently stuck in her throat and in her chest w/ associated heart burn. She denies associated nausea/vomiting but does state that it takes a while for the feeling to go away and often rubs her throat or chest to relieve the pain. She denies any prior hx of esophageal strictures or dysphagia in the past even after the sleeve.     Her last EGD and colonoscopy was 2 years ago for screening purposes, colonoscopy w/ benign polyps, EGD otherwise wnl. Patient denies any prior episodes of hematemesis, coffee ground emesis or GI bleeding.     In the ED patient was otherwise stable. Hgb was 10.8 (baseline 12 in 2022). Patient admitted for likely UGIB.         ROS:   General:  No fevers, chills, night sweats  Eyes:  Good vision, no reported pain  ENT:  No sore throat, pain, runny nose  CV:  No pain, palpitations  Pulm:  No dyspnea, cough  GI:  See HPI, otherwise negative  :  No incontinence, nocturia  Muscle:  No reported pain, weakness  Neuro:  No memory problems  Psych:  No insomnia, psychosis  Endocrine:  No polyuria, polydipsia  Heme:  No petechiae, ecchymosis, easy bruisability  Skin:  No reported rash    PMHX/PSHX:    Migraines    Anemia    Anxiety    Migraines    Hypertension    Gastric banding status    S/P cholecystectomy    History of cholecystectomy    History of sleeve gastrectomy      Allergies:  oxycodone (Rash)  morphine (Short breath)  morphine (Stomach Upset)  Augmentin (Unknown)      Home Medications: reviewed  Hospital Medications:  acetaminophen   IVPB .. 1000 milliGRAM(s) IV Intermittent every 6 hours  folic acid Injectable 1 milliGRAM(s) IV Push once  lactated ringers. 1000 milliLiter(s) IV Continuous <Continuous>  levothyroxine Injectable 60 MICROGram(s) IV Push at bedtime  ondansetron Injectable 4 milliGRAM(s) IV Push once  pantoprazole  Injectable 40 milliGRAM(s) IV Push every 12 hours  thiamine Injectable 100 milliGRAM(s) IV Push once      Social History:   Tobacco: Denies  EtOH: Denies  Illicit Drugs: Denies    Family history:    No pertinent family history in first degree relatives    FH: lung cancer (Father)    Family history of pancreatic cancer (Father)    FH: type 2 diabetes (Mother)      Denies family history of colon cancer/polyps, stomach cancer/polyps, pancreatic cancer/masses, liver cancer/disease, ovarian cancer and endometrial cancer.    PHYSICAL EXAM:   Vital Signs:  Vital Signs Last 24 Hrs  T(C): 36.6 (26 Jun 2025 04:27), Max: 37.1 (25 Jun 2025 21:20)  T(F): 97.8 (26 Jun 2025 04:27), Max: 98.7 (25 Jun 2025 21:20)  HR: 56 (26 Jun 2025 04:27) (56 - 77)  BP: 135/77 (26 Jun 2025 04:27) (135/77 - 182/95)  BP(mean): --  RR: 17 (26 Jun 2025 04:27) (17 - 20)  SpO2: 100% (26 Jun 2025 04:27) (98% - 100%)    Parameters below as of 26 Jun 2025 04:27  Patient On (Oxygen Delivery Method): room air      Daily Height in cm: 167.64 (25 Jun 2025 19:12)    Daily     GENERAL: no acute distress  NEURO: alert  HEENT: anicteric sclera, no conjunctival pallor appreciated  CHEST: no respiratory distress, no accessory muscle use  CARDIAC: regular rate, +S1/S2  ABDOMEN: epigastric TTP   EXTREMITIES: warm, well perfused, no edema  SKIN: no lesions noted    LABS: reviewed                        10.8   8.02  )-----------( 386      ( 26 Jun 2025 06:40 )             34.6     06-26    141  |  105  |  7   ----------------------------<  98  3.8   |  23  |  0.77    Ca    8.8      26 Jun 2025 06:40  Phos  2.7     06-26  Mg     2.1     06-26    TPro  7.4  /  Alb  4.0  /  TBili  0.2  /  DBili  x   /  AST  11  /  ALT  8[L]  /  AlkPhos  112  06-25    LIVER FUNCTIONS - ( 25 Jun 2025 21:27 )  Alb: 4.0 g/dL / Pro: 7.4 g/dL / ALK PHOS: 112 U/L / ALT: 8 U/L / AST: 11 U/L / GGT: x               Diagnostic Studies: see sunrise for full report         Chief Complaint:  Patient is a 54y old  Female who presents with a chief complaint of Gastrointestinal bleed. (26 Jun 2025 04:03)      HPI:  EAMON LU is a 54year old Female with history of laparoscopic sleeve gastrectomy and cholecystectomy (2009), hypothyroidism presenting in the setting of hematemesis.    The patient reported that 2 days ago she had 2 episodes of emesis, which she describes as dark brown, coffee ground emesis, and a second episodes with a small tinge fof dark blood. Yesterday she had one episode of hematemesis w/ dark blood in emesis, prompting her to present to the hospital. She also reported associated epigastric pain w/ nausea for the past 2 days. She reports that she has been taking motrin 600 mg BID for the past 3-4 days due to a knot in her neck. No other recent changes to medications.    The patient reports that she has also been experiencing dysphagia intermittently to both solids and liquids, with food intermittently stuck in her throat and in her chest w/ associated heart burn. It has been ongoing for 5 months. She denies weight loss recently. She denies associated nausea/vomiting but does state that it takes a while for the feeling to go away and often rubs her throat or chest to relieve the pain. She denies any prior hx of esophageal strictures or dysphagia in the past even after the sleeve.     Her last EGD and colonoscopy was 2 years ago for screening purposes, colonoscopy w/ benign polyps, EGD otherwise wnl. Patient denies any prior episodes of hematemesis, coffee ground emesis or GI bleeding.     In the ED patient was otherwise stable. Hgb was 10.8 (baseline 12 in 2022). Patient admitted for likely UGIB.         ROS:   General:  No fevers, chills, night sweats  Eyes:  Good vision, no reported pain  ENT:  No sore throat, pain, runny nose  CV:  No pain, palpitations  Pulm:  No dyspnea, cough  GI:  See HPI, otherwise negative  :  No incontinence, nocturia  Muscle:  No reported pain, weakness  Neuro:  No memory problems  Psych:  No insomnia, psychosis  Endocrine:  No polyuria, polydipsia  Heme:  No petechiae, ecchymosis, easy bruisability  Skin:  No reported rash    PMHX/PSHX:    Migraines    Anemia    Anxiety    Migraines    Hypertension    Gastric banding status    S/P cholecystectomy    History of cholecystectomy    History of sleeve gastrectomy      Allergies:  oxycodone (Rash)  morphine (Short breath)  morphine (Stomach Upset)  Augmentin (Unknown)      Home Medications: reviewed  Hospital Medications:  acetaminophen   IVPB .. 1000 milliGRAM(s) IV Intermittent every 6 hours  folic acid Injectable 1 milliGRAM(s) IV Push once  lactated ringers. 1000 milliLiter(s) IV Continuous <Continuous>  levothyroxine Injectable 60 MICROGram(s) IV Push at bedtime  ondansetron Injectable 4 milliGRAM(s) IV Push once  pantoprazole  Injectable 40 milliGRAM(s) IV Push every 12 hours  thiamine Injectable 100 milliGRAM(s) IV Push once      Social History:   Tobacco: Denies  EtOH: Denies  Illicit Drugs: Denies    Family history:    No pertinent family history in first degree relatives    FH: lung cancer (Father)    Family history of pancreatic cancer (Father)    FH: type 2 diabetes (Mother)      Denies family history of colon cancer/polyps, stomach cancer/polyps, pancreatic cancer/masses, liver cancer/disease, ovarian cancer and endometrial cancer.    PHYSICAL EXAM:   Vital Signs:  Vital Signs Last 24 Hrs  T(C): 36.6 (26 Jun 2025 04:27), Max: 37.1 (25 Jun 2025 21:20)  T(F): 97.8 (26 Jun 2025 04:27), Max: 98.7 (25 Jun 2025 21:20)  HR: 56 (26 Jun 2025 04:27) (56 - 77)  BP: 135/77 (26 Jun 2025 04:27) (135/77 - 182/95)  BP(mean): --  RR: 17 (26 Jun 2025 04:27) (17 - 20)  SpO2: 100% (26 Jun 2025 04:27) (98% - 100%)    Parameters below as of 26 Jun 2025 04:27  Patient On (Oxygen Delivery Method): room air      Daily Height in cm: 167.64 (25 Jun 2025 19:12)    Daily     GENERAL: no acute distress  NEURO: alert  HEENT: anicteric sclera, no conjunctival pallor appreciated  CHEST: no respiratory distress, no accessory muscle use  CARDIAC: regular rate, +S1/S2  ABDOMEN: epigastric TTP   EXTREMITIES: warm, well perfused, no edema  SKIN: no lesions noted    LABS: reviewed                        10.8   8.02  )-----------( 386      ( 26 Jun 2025 06:40 )             34.6     06-26    141  |  105  |  7   ----------------------------<  98  3.8   |  23  |  0.77    Ca    8.8      26 Jun 2025 06:40  Phos  2.7     06-26  Mg     2.1     06-26    TPro  7.4  /  Alb  4.0  /  TBili  0.2  /  DBili  x   /  AST  11  /  ALT  8[L]  /  AlkPhos  112  06-25    LIVER FUNCTIONS - ( 25 Jun 2025 21:27 )  Alb: 4.0 g/dL / Pro: 7.4 g/dL / ALK PHOS: 112 U/L / ALT: 8 U/L / AST: 11 U/L / GGT: x               Diagnostic Studies: see sunrise for full report

## 2025-06-27 ENCOUNTER — TRANSCRIPTION ENCOUNTER (OUTPATIENT)
Age: 55
End: 2025-06-27

## 2025-06-27 VITALS
HEART RATE: 79 BPM | OXYGEN SATURATION: 100 % | SYSTOLIC BLOOD PRESSURE: 130 MMHG | DIASTOLIC BLOOD PRESSURE: 80 MMHG | TEMPERATURE: 98 F | RESPIRATION RATE: 18 BRPM

## 2025-06-27 LAB
ANION GAP SERPL CALC-SCNC: 10 MMOL/L — SIGNIFICANT CHANGE UP (ref 5–17)
BUN SERPL-MCNC: 9 MG/DL — SIGNIFICANT CHANGE UP (ref 7–23)
CALCIUM SERPL-MCNC: 8.8 MG/DL — SIGNIFICANT CHANGE UP (ref 8.4–10.5)
CHLORIDE SERPL-SCNC: 107 MMOL/L — SIGNIFICANT CHANGE UP (ref 96–108)
CO2 SERPL-SCNC: 24 MMOL/L — SIGNIFICANT CHANGE UP (ref 22–31)
CREAT SERPL-MCNC: 0.75 MG/DL — SIGNIFICANT CHANGE UP (ref 0.5–1.3)
EGFR: 95 ML/MIN/1.73M2 — SIGNIFICANT CHANGE UP
EGFR: 95 ML/MIN/1.73M2 — SIGNIFICANT CHANGE UP
GLUCOSE SERPL-MCNC: 84 MG/DL — SIGNIFICANT CHANGE UP (ref 70–99)
HCT VFR BLD CALC: 33.1 % — LOW (ref 34.5–45)
HGB BLD-MCNC: 10.3 G/DL — LOW (ref 11.5–15.5)
MAGNESIUM SERPL-MCNC: 2 MG/DL — SIGNIFICANT CHANGE UP (ref 1.6–2.6)
MCHC RBC-ENTMCNC: 25.8 PG — LOW (ref 27–34)
MCHC RBC-ENTMCNC: 31.1 G/DL — LOW (ref 32–36)
MCV RBC AUTO: 83 FL — SIGNIFICANT CHANGE UP (ref 80–100)
NRBC # BLD AUTO: 0 K/UL — SIGNIFICANT CHANGE UP (ref 0–0)
NRBC # FLD: 0 K/UL — SIGNIFICANT CHANGE UP (ref 0–0)
NRBC BLD AUTO-RTO: 0 /100 WBCS — SIGNIFICANT CHANGE UP (ref 0–0)
PHOSPHATE SERPL-MCNC: 2.9 MG/DL — SIGNIFICANT CHANGE UP (ref 2.5–4.5)
PLATELET # BLD AUTO: 375 K/UL — SIGNIFICANT CHANGE UP (ref 150–400)
PMV BLD: 10.5 FL — SIGNIFICANT CHANGE UP (ref 7–13)
POTASSIUM SERPL-MCNC: 4 MMOL/L — SIGNIFICANT CHANGE UP (ref 3.5–5.3)
POTASSIUM SERPL-SCNC: 4 MMOL/L — SIGNIFICANT CHANGE UP (ref 3.5–5.3)
RBC # BLD: 3.99 M/UL — SIGNIFICANT CHANGE UP (ref 3.8–5.2)
RBC # FLD: 14.5 % — SIGNIFICANT CHANGE UP (ref 10.3–14.5)
SODIUM SERPL-SCNC: 141 MMOL/L — SIGNIFICANT CHANGE UP (ref 135–145)
WBC # BLD: 7.05 K/UL — SIGNIFICANT CHANGE UP (ref 3.8–10.5)
WBC # FLD AUTO: 7.05 K/UL — SIGNIFICANT CHANGE UP (ref 3.8–10.5)

## 2025-06-27 PROCEDURE — 85025 COMPLETE CBC W/AUTO DIFF WBC: CPT

## 2025-06-27 PROCEDURE — 85018 HEMOGLOBIN: CPT

## 2025-06-27 PROCEDURE — 93005 ELECTROCARDIOGRAM TRACING: CPT

## 2025-06-27 PROCEDURE — 85730 THROMBOPLASTIN TIME PARTIAL: CPT

## 2025-06-27 PROCEDURE — 36415 COLL VENOUS BLD VENIPUNCTURE: CPT

## 2025-06-27 PROCEDURE — 84132 ASSAY OF SERUM POTASSIUM: CPT

## 2025-06-27 PROCEDURE — 96375 TX/PRO/DX INJ NEW DRUG ADDON: CPT

## 2025-06-27 PROCEDURE — 82330 ASSAY OF CALCIUM: CPT

## 2025-06-27 PROCEDURE — 80048 BASIC METABOLIC PNL TOTAL CA: CPT

## 2025-06-27 PROCEDURE — 84295 ASSAY OF SERUM SODIUM: CPT

## 2025-06-27 PROCEDURE — 86900 BLOOD TYPING SEROLOGIC ABO: CPT

## 2025-06-27 PROCEDURE — 88305 TISSUE EXAM BY PATHOLOGIST: CPT

## 2025-06-27 PROCEDURE — 83735 ASSAY OF MAGNESIUM: CPT

## 2025-06-27 PROCEDURE — 0241U: CPT

## 2025-06-27 PROCEDURE — 82803 BLOOD GASES ANY COMBINATION: CPT

## 2025-06-27 PROCEDURE — 84100 ASSAY OF PHOSPHORUS: CPT

## 2025-06-27 PROCEDURE — 86901 BLOOD TYPING SEROLOGIC RH(D): CPT

## 2025-06-27 PROCEDURE — 85014 HEMATOCRIT: CPT

## 2025-06-27 PROCEDURE — 74177 CT ABD & PELVIS W/CONTRAST: CPT

## 2025-06-27 PROCEDURE — 70450 CT HEAD/BRAIN W/O DYE: CPT

## 2025-06-27 PROCEDURE — 85610 PROTHROMBIN TIME: CPT

## 2025-06-27 PROCEDURE — 83690 ASSAY OF LIPASE: CPT

## 2025-06-27 PROCEDURE — 88312 SPECIAL STAINS GROUP 1: CPT

## 2025-06-27 PROCEDURE — 96374 THER/PROPH/DIAG INJ IV PUSH: CPT

## 2025-06-27 PROCEDURE — 85027 COMPLETE CBC AUTOMATED: CPT

## 2025-06-27 PROCEDURE — 87637 SARSCOV2&INF A&B&RSV AMP PRB: CPT

## 2025-06-27 PROCEDURE — 86850 RBC ANTIBODY SCREEN: CPT

## 2025-06-27 PROCEDURE — 80053 COMPREHEN METABOLIC PANEL: CPT

## 2025-06-27 PROCEDURE — 82435 ASSAY OF BLOOD CHLORIDE: CPT

## 2025-06-27 PROCEDURE — 82947 ASSAY GLUCOSE BLOOD QUANT: CPT

## 2025-06-27 PROCEDURE — 84484 ASSAY OF TROPONIN QUANT: CPT

## 2025-06-27 PROCEDURE — 71260 CT THORAX DX C+: CPT

## 2025-06-27 PROCEDURE — 83605 ASSAY OF LACTIC ACID: CPT

## 2025-06-27 PROCEDURE — 99285 EMERGENCY DEPT VISIT HI MDM: CPT | Mod: 25

## 2025-06-27 RX ORDER — ACETAMINOPHEN 500 MG/5ML
1000 LIQUID (ML) ORAL EVERY 6 HOURS
Refills: 0 | Status: DISCONTINUED | OUTPATIENT
Start: 2025-06-27 | End: 2025-06-27

## 2025-06-27 RX ADMIN — Medication 400 MILLIGRAM(S): at 12:08

## 2025-06-27 RX ADMIN — HEPARIN SODIUM 5000 UNIT(S): 1000 INJECTION INTRAVENOUS; SUBCUTANEOUS at 05:33

## 2025-06-27 RX ADMIN — SODIUM CHLORIDE 150 MILLILITER(S): 9 INJECTION, SOLUTION INTRAVENOUS at 00:39

## 2025-06-27 RX ADMIN — Medication 1000 MILLIGRAM(S): at 01:09

## 2025-06-27 RX ADMIN — Medication 40 MILLIGRAM(S): at 05:33

## 2025-06-27 RX ADMIN — Medication 400 MILLIGRAM(S): at 00:39

## 2025-06-27 NOTE — DISCHARGE NOTE NURSING/CASE MANAGEMENT/SOCIAL WORK - PATIENT PORTAL LINK FT
You can access the FollowMyHealth Patient Portal offered by [facility] by registering at the following website: [email or web address]. By joining [facility]’s FollowMyHealth portal, you will also be able to view your health information using other applications (apps) compatible with our system.

## 2025-06-27 NOTE — DISCHARGE NOTE PROVIDER - NSDCCPCAREPLAN_GEN_ALL_CORE_FT
PRINCIPAL DISCHARGE DIAGNOSIS  Diagnosis: Hematemesis  Assessment and Plan of Treatment: HOSPITAL COURSE: Gastroenterology saw you in the hospital, and performed an upper endoscopy, which showed medium-sized hiatal hernia with shelf in the proximal stomach due to post-operative changes; no evidence of bleeding seen on this exam.  DIET: Continue with full liquid diet for 1 week. You may transition to soft diet after.   FOLLOW UP: Follow up with Dr. Medina in 2 weeks for elective hiatal hernia repair with conversion of sleeve to RYGB for treatment of dysphagia, reflux, and obesity.

## 2025-06-27 NOTE — DIETITIAN INITIAL EVALUATION ADULT - REASON FOR ADMISSION
Gastrointestinal bleed.    Chart reviewed, events noted. This is a "54y Female patient w/ PSHx of sleeve gastrectomy in 2009 who presented with hematochezia and hematemasis. s/p upper endoscopy with GI with no active sources of bleeding, but with a medium sized hiatal hernia"

## 2025-06-27 NOTE — DISCHARGE NOTE PROVIDER - NSDCMRMEDTOKEN_GEN_ALL_CORE_FT
atorvastatin: once a day (at bedtime)  famotidine 40 mg oral tablet: 1 tab(s) orally once a day (at bedtime)  levothyroxine 112 mcg (0.112 mg) oral tablet: 1 tab(s) orally Daily on Mondays, Tuesdays and Wednesdays.  levothyroxine 125 mcg (0.125 mg) oral tablet: 1 tab(s) orally Daily on Thursdays, Fridays, Saturdays and Sundays.  methIMAzole 5 mg oral tablet: 1 tab(s) orally every 8 hours  Multiple Vitamins with Iron oral tablet: 1 tab(s) orally once a day  Protonix 40 mg oral delayed release tablet: 1 tab(s) orally once a day Please only take if not already taking home Protonix  semaglutide: once a week  Vitamin D2 1.25 mg (50,000 intl units) oral capsule: 1 cap(s) orally once a week (on monday)

## 2025-06-27 NOTE — DIETITIAN INITIAL EVALUATION ADULT - EDUCATION DIETARY MODIFICATIONS
Reviewed full liquid diet recommendations. Encourage intake of protein-rich foods, encourage intake of protein supplement (ie Ensure Max, Premier Protein) to help optimize protein intake./teach back/(2) meets goals/outcomes/verbalization

## 2025-06-27 NOTE — DISCHARGE NOTE PROVIDER - CARE PROVIDERS DIRECT ADDRESSES
Last Office Visit Info:   The patient's last visit with Chiquis Chan MD was on 12/16/2019.    The patient's last visit in current department was on 12/16/2019.        Last CBC Results:   Lab Results   Component Value Date    WBC 5.57 10/24/2018    HGB 15.6 10/24/2018    HCT 47.4 10/24/2018     10/24/2018       Last CMP Results  Lab Results   Component Value Date     10/24/2018     10/24/2018    K 4.3 10/24/2018    K 4.3 10/24/2018     10/24/2018     10/24/2018    CO2 28 10/24/2018    CO2 28 10/24/2018    BUN 34 (H) 10/24/2018    BUN 34 (H) 10/24/2018    CREATININE 2.0 (H) 10/24/2018    CREATININE 2.0 (H) 10/24/2018    CALCIUM 10.4 10/24/2018    CALCIUM 10.4 10/24/2018    ALBUMIN 4.4 10/24/2018    AST 63 (H) 10/24/2018    ALT 32 10/24/2018       Last Lipids  Lab Results   Component Value Date    CHOL 175 10/24/2018    TRIG 136 10/24/2018    HDL 54 10/24/2018    LDLCALC 93.8 10/24/2018       Last A1C  No results found for: HGBA1C    Last TSH  Lab Results   Component Value Date    TSH 0.134 (L) 10/24/2018         Current Med Refills  Medication List with Changes/Refills   Current Medications    AMLODIPINE (NORVASC) 5 MG TABLET    TAKE 1 TABLET(5 MG) BY MOUTH EVERY DAY       Start Date: 12/16/2019End Date: --    LOSARTAN-HYDROCHLOROTHIAZIDE 100-12.5 MG (HYZAAR) 100-12.5 MG TAB    Take 1 tablet by mouth once daily.       Start Date: 12/16/2019End Date: --                  [email or web address]

## 2025-06-27 NOTE — DIETITIAN INITIAL EVALUATION ADULT - ENERGY INTAKE
In-house pt reports tolerance to full liquid diet, no acute GI distress currently. Per RN, plan for discharge home on full liquid diet x 1 week followed by soft diet afterwards if tolerating.   Fair (50-75%)

## 2025-06-27 NOTE — PROGRESS NOTE ADULT - ATTENDING COMMENTS
Feeling well; no bleeding  EGD showed no ulcers, large hiatal hernia    Abdomen remains soft and nontender    Will start full liquid diet today  Cont PPI  Anticipate discharge home with f/u with me for surgical planning    Luiz Medina MD

## 2025-06-27 NOTE — PROGRESS NOTE ADULT - SUBJECTIVE AND OBJECTIVE BOX
Surgery Progress Note     Overnight events: NAEO    Interval/Subjective:  Patient seen and examined.   __________________________________________________________________  Vitals:  T(C): 36.7 (20:18), Max: 36.8 (15:45)  HR: 75 (20:18) (55 - 88)  BP: 147/84 (20:18) (125/67 - 158/74)  RR: 18 (20:18) (15 - 20)  SpO2: 97% (20:18) (96% - 100%)    I & O's:    06-26-25 @ 07:01  -  06-27-25 @ 01:09  --------------------------------------------------------  OUT:    Voided (mL): 0.32 mL/kg/hr  Total OUT: 0.32 mL/kg/hr      Physical Exam:  General: NAD, resting comfortably in bed  HEENT: Normocephalic atraumatic  Respiratory: Nonlabored respirations, normal CW expansion.  Cardio: S1S2, regular rate and rhythm.  Abdomen: soft, nondistended, minimally tender in the epigastric area  Vascular: extremities are warm and well perfused.       __________________________________________________________________

## 2025-06-27 NOTE — DIETITIAN INITIAL EVALUATION ADULT - ADD RECOMMEND
1) defer diet advancement to team. Recommend addition of Ensure Max protein shakes 2x daily.  2) Reviewed full liquid diet with patient 3)RD to remain available and follow-up as medically appropriate.

## 2025-06-27 NOTE — DIETITIAN INITIAL EVALUATION ADULT - OTHER INFO
Weight: pt reports difficulty losing weight. States she was ~ 252lbs prior to bariatric surgery, lost weight initially after surgery, maintained weight ~ 200lbs x 2-3 years however now reports weight regain. Reports trying Ozempic/Wegovy however reports no weight loss. Current dosing weight is 231lbs.

## 2025-06-27 NOTE — DISCHARGE NOTE NURSING/CASE MANAGEMENT/SOCIAL WORK - FINANCIAL ASSISTANCE
[facility] provides services at a reduced cost to those who are determined to be eligible through [facility]’s financial assistance program. Information regarding [facility]’s financial assistance program can be found by going to [email or web address] or by calling [phone].

## 2025-06-27 NOTE — DIETITIAN INITIAL EVALUATION ADULT - PERTINENT LABORATORY DATA
06-27    141  |  107  |  9   ----------------------------<  84  4.0   |  24  |  0.75    Ca    8.8      27 Jun 2025 06:41  Phos  2.9     06-27  Mg     2.0     06-27    TPro  7.4  /  Alb  4.0  /  TBili  0.2  /  DBili  x   /  AST  11  /  ALT  8[L]  /  AlkPhos  112  06-25

## 2025-06-27 NOTE — DIETITIAN INITIAL EVALUATION ADULT - PERTINENT MEDS FT
MEDICATIONS  (STANDING):  acetaminophen   IVPB .. 1000 milliGRAM(s) IV Intermittent every 6 hours  heparin   Injectable 5000 Unit(s) SubCutaneous every 8 hours  levothyroxine Injectable 60 MICROGram(s) IV Push at bedtime  ondansetron Injectable 4 milliGRAM(s) IV Push once  pantoprazole  Injectable 40 milliGRAM(s) IV Push every 12 hours  thiamine Injectable 100 milliGRAM(s) IV Push once    MEDICATIONS  (PRN):

## 2025-06-27 NOTE — PROGRESS NOTE ADULT - ASSESSMENT
54y Female patient w/ PSHx of sleeve gastrectomy in 2009 who presented with hematochezia and hematemasis. s/p upper endoscopy with GI with no active sources of bleeding, but with a medium sized hiatal hernia    Plan:   - barium swallow today  - Pain control PRN  - Diet: NPO  - IVF  - Activity: as tolerated  - DVT ppx: SCD's & subq heparin

## 2025-06-27 NOTE — DISCHARGE NOTE PROVIDER - CARE PROVIDER_API CALL
Luiz Medina  Surgery (General Surgery)  [address], [address]  [place] [address]  Phone: [phone]  Fax: [phone]  Follow Up Time: 2 weeks

## 2025-06-27 NOTE — DIETITIAN INITIAL EVALUATION ADULT - ORAL INTAKE PTA/DIET HISTORY
Pt reports on going reflux since bariatric surgery in 2009. Reports emesis after eating, more recently also after drinking. Consumes small frequent meals. NKFA. Pt denies chewing/swallowing difficulty,  diarrhea, constipation.

## 2025-07-01 LAB — SURGICAL PATHOLOGY STUDY: SIGNIFICANT CHANGE UP

## (undated) DEVICE — BITE BLOCK ADULT 20 X 27MM (GREEN)

## (undated) DEVICE — TUBING IV SET GRAVITY 3Y 100" MACRO

## (undated) DEVICE — FOLEY HOLDER STATLOCK 2 WAY ADULT

## (undated) DEVICE — TUBING SUCTION CONN 6FT STERILE

## (undated) DEVICE — BALLOON US ENDO

## (undated) DEVICE — CATH IV SAFE BC 20G X 1.16" (PINK)

## (undated) DEVICE — TUBING SUCTION 20FT

## (undated) DEVICE — SUCTION YANKAUER NO CONTROL VENT

## (undated) DEVICE — SOL INJ NS 0.9% 500ML 2 PORT

## (undated) DEVICE — CATH IV SAFE BC 22G X 1" (BLUE)

## (undated) DEVICE — BIOPSY FORCEP RADIAL JAW 4 STANDARD WITH NEEDLE

## (undated) DEVICE — SENSOR O2 FINGER ADULT

## (undated) DEVICE — SYR ALLIANCE II INFLATION 60ML

## (undated) DEVICE — PACK IV START WITH CHG